# Patient Record
Sex: MALE | Race: WHITE | Employment: OTHER | URBAN - METROPOLITAN AREA
[De-identification: names, ages, dates, MRNs, and addresses within clinical notes are randomized per-mention and may not be internally consistent; named-entity substitution may affect disease eponyms.]

---

## 2021-08-15 ENCOUNTER — APPOINTMENT (OUTPATIENT)
Dept: CT IMAGING | Age: 71
DRG: 068 | End: 2021-08-15
Payer: MEDICARE

## 2021-08-15 ENCOUNTER — HOSPITAL ENCOUNTER (INPATIENT)
Age: 71
LOS: 3 days | Discharge: HOME | DRG: 068 | End: 2021-08-18
Attending: EMERGENCY MEDICINE | Admitting: INTERNAL MEDICINE
Payer: MEDICARE

## 2021-08-15 DIAGNOSIS — G47.30 SLEEP APNEA, UNSPECIFIED TYPE: ICD-10-CM

## 2021-08-15 DIAGNOSIS — I65.1 BASILAR ARTERY OCCLUSION: ICD-10-CM

## 2021-08-15 DIAGNOSIS — G45.0 VERTEBROBASILAR INSUFFICIENCY: ICD-10-CM

## 2021-08-15 DIAGNOSIS — R42 DIZZINESS: ICD-10-CM

## 2021-08-15 DIAGNOSIS — R42 VERTIGO: Primary | ICD-10-CM

## 2021-08-15 LAB
A/G RATIO: 1.2 (ref 1.1–2.2)
ALBUMIN SERPL-MCNC: 4.2 G/DL (ref 3.4–5)
ALP BLD-CCNC: 103 U/L (ref 40–129)
ALT SERPL-CCNC: 20 U/L (ref 10–40)
ANION GAP SERPL CALCULATED.3IONS-SCNC: 11 MMOL/L (ref 3–16)
APTT: 37.5 SEC (ref 26.2–38.6)
AST SERPL-CCNC: 18 U/L (ref 15–37)
BASOPHILS ABSOLUTE: 0 K/UL (ref 0–0.2)
BASOPHILS RELATIVE PERCENT: 0.5 %
BILIRUB SERPL-MCNC: <0.2 MG/DL (ref 0–1)
BILIRUBIN URINE: NEGATIVE
BLOOD, URINE: NEGATIVE
BUN BLDV-MCNC: 14 MG/DL (ref 7–20)
CALCIUM SERPL-MCNC: 9.4 MG/DL (ref 8.3–10.6)
CHLORIDE BLD-SCNC: 101 MMOL/L (ref 99–110)
CLARITY: CLEAR
CO2: 29 MMOL/L (ref 21–32)
COLOR: YELLOW
CREAT SERPL-MCNC: 0.9 MG/DL (ref 0.8–1.3)
EOSINOPHILS ABSOLUTE: 0.2 K/UL (ref 0–0.6)
EOSINOPHILS RELATIVE PERCENT: 2.3 %
GFR AFRICAN AMERICAN: >60
GFR NON-AFRICAN AMERICAN: >60
GLOBULIN: 3.5 G/DL
GLUCOSE BLD-MCNC: 94 MG/DL (ref 70–99)
GLUCOSE URINE: NEGATIVE MG/DL
HCT VFR BLD CALC: 43 % (ref 40.5–52.5)
HEMOGLOBIN: 15 G/DL (ref 13.5–17.5)
INR BLD: 0.98 (ref 0.88–1.12)
KETONES, URINE: NEGATIVE MG/DL
LEUKOCYTE ESTERASE, URINE: NEGATIVE
LYMPHOCYTES ABSOLUTE: 2.6 K/UL (ref 1–5.1)
LYMPHOCYTES RELATIVE PERCENT: 29.1 %
MCH RBC QN AUTO: 31.1 PG (ref 26–34)
MCHC RBC AUTO-ENTMCNC: 34.8 G/DL (ref 31–36)
MCV RBC AUTO: 89.4 FL (ref 80–100)
MICROSCOPIC EXAMINATION: NORMAL
MONOCYTES ABSOLUTE: 0.8 K/UL (ref 0–1.3)
MONOCYTES RELATIVE PERCENT: 8.8 %
NEUTROPHILS ABSOLUTE: 5.3 K/UL (ref 1.7–7.7)
NEUTROPHILS RELATIVE PERCENT: 59.3 %
NITRITE, URINE: NEGATIVE
PDW BLD-RTO: 13.6 % (ref 12.4–15.4)
PH UA: 7.5 (ref 5–8)
PLATELET # BLD: 221 K/UL (ref 135–450)
PMV BLD AUTO: 7.7 FL (ref 5–10.5)
POTASSIUM REFLEX MAGNESIUM: 4 MMOL/L (ref 3.5–5.1)
PRO-BNP: 92 PG/ML (ref 0–124)
PROTEIN UA: NEGATIVE MG/DL
PROTHROMBIN TIME: 11.1 SEC (ref 9.9–12.7)
RBC # BLD: 4.82 M/UL (ref 4.2–5.9)
SODIUM BLD-SCNC: 141 MMOL/L (ref 136–145)
SPECIFIC GRAVITY UA: 1.01 (ref 1–1.03)
TOTAL PROTEIN: 7.7 G/DL (ref 6.4–8.2)
TROPONIN: <0.01 NG/ML
URINE TYPE: NORMAL
UROBILINOGEN, URINE: 0.2 E.U./DL
WBC # BLD: 8.9 K/UL (ref 4–11)

## 2021-08-15 PROCEDURE — 99283 EMERGENCY DEPT VISIT LOW MDM: CPT

## 2021-08-15 PROCEDURE — 83880 ASSAY OF NATRIURETIC PEPTIDE: CPT

## 2021-08-15 PROCEDURE — 85730 THROMBOPLASTIN TIME PARTIAL: CPT

## 2021-08-15 PROCEDURE — 85025 COMPLETE CBC W/AUTO DIFF WBC: CPT

## 2021-08-15 PROCEDURE — 6370000000 HC RX 637 (ALT 250 FOR IP): Performed by: EMERGENCY MEDICINE

## 2021-08-15 PROCEDURE — 2060000000 HC ICU INTERMEDIATE R&B

## 2021-08-15 PROCEDURE — 93005 ELECTROCARDIOGRAM TRACING: CPT | Performed by: INTERNAL MEDICINE

## 2021-08-15 PROCEDURE — 2580000003 HC RX 258: Performed by: EMERGENCY MEDICINE

## 2021-08-15 PROCEDURE — 84484 ASSAY OF TROPONIN QUANT: CPT

## 2021-08-15 PROCEDURE — 81003 URINALYSIS AUTO W/O SCOPE: CPT

## 2021-08-15 PROCEDURE — 70450 CT HEAD/BRAIN W/O DYE: CPT

## 2021-08-15 PROCEDURE — 6360000004 HC RX CONTRAST MEDICATION: Performed by: EMERGENCY MEDICINE

## 2021-08-15 PROCEDURE — 85610 PROTHROMBIN TIME: CPT

## 2021-08-15 PROCEDURE — 70496 CT ANGIOGRAPHY HEAD: CPT

## 2021-08-15 PROCEDURE — 80053 COMPREHEN METABOLIC PANEL: CPT

## 2021-08-15 RX ORDER — ONDANSETRON 4 MG/1
4 TABLET, ORALLY DISINTEGRATING ORAL EVERY 8 HOURS PRN
Status: DISCONTINUED | OUTPATIENT
Start: 2021-08-15 | End: 2021-08-18 | Stop reason: HOSPADM

## 2021-08-15 RX ORDER — POLYETHYLENE GLYCOL 3350 17 G/17G
17 POWDER, FOR SOLUTION ORAL DAILY PRN
Status: DISCONTINUED | OUTPATIENT
Start: 2021-08-15 | End: 2021-08-18 | Stop reason: HOSPADM

## 2021-08-15 RX ORDER — ASPIRIN 81 MG/1
81 TABLET ORAL DAILY
Status: DISCONTINUED | OUTPATIENT
Start: 2021-08-16 | End: 2021-08-18 | Stop reason: HOSPADM

## 2021-08-15 RX ORDER — SODIUM CHLORIDE, SODIUM LACTATE, POTASSIUM CHLORIDE, CALCIUM CHLORIDE 600; 310; 30; 20 MG/100ML; MG/100ML; MG/100ML; MG/100ML
1000 INJECTION, SOLUTION INTRAVENOUS ONCE
Status: COMPLETED | OUTPATIENT
Start: 2021-08-15 | End: 2021-08-15

## 2021-08-15 RX ORDER — ACETAMINOPHEN 325 MG/1
650 TABLET ORAL EVERY 6 HOURS PRN
Status: DISCONTINUED | OUTPATIENT
Start: 2021-08-15 | End: 2021-08-18 | Stop reason: HOSPADM

## 2021-08-15 RX ORDER — HYDROCHLOROTHIAZIDE 25 MG/1
12.5 TABLET ORAL DAILY
Status: CANCELLED | OUTPATIENT
Start: 2021-08-16

## 2021-08-15 RX ORDER — HYDROCHLOROTHIAZIDE 25 MG/1
25 TABLET ORAL DAILY
Status: ON HOLD | COMMUNITY
End: 2021-08-18 | Stop reason: HOSPADM

## 2021-08-15 RX ORDER — ONDANSETRON 2 MG/ML
4 INJECTION INTRAMUSCULAR; INTRAVENOUS EVERY 6 HOURS PRN
Status: DISCONTINUED | OUTPATIENT
Start: 2021-08-15 | End: 2021-08-18 | Stop reason: HOSPADM

## 2021-08-15 RX ORDER — CLOPIDOGREL 300 MG/1
600 TABLET, FILM COATED ORAL ONCE
Status: COMPLETED | OUTPATIENT
Start: 2021-08-15 | End: 2021-08-15

## 2021-08-15 RX ORDER — ACETAMINOPHEN 650 MG/1
650 SUPPOSITORY RECTAL EVERY 6 HOURS PRN
Status: DISCONTINUED | OUTPATIENT
Start: 2021-08-15 | End: 2021-08-18 | Stop reason: HOSPADM

## 2021-08-15 RX ORDER — AMLODIPINE BESYLATE 5 MG/1
5 TABLET ORAL DAILY
Status: CANCELLED | OUTPATIENT
Start: 2021-08-16

## 2021-08-15 RX ORDER — AMLODIPINE BESYLATE 5 MG/1
5 TABLET ORAL DAILY
Status: ON HOLD | COMMUNITY
End: 2021-08-18 | Stop reason: HOSPADM

## 2021-08-15 RX ORDER — SODIUM CHLORIDE 0.9 % (FLUSH) 0.9 %
5-40 SYRINGE (ML) INJECTION PRN
Status: DISCONTINUED | OUTPATIENT
Start: 2021-08-15 | End: 2021-08-18 | Stop reason: HOSPADM

## 2021-08-15 RX ORDER — SODIUM CHLORIDE 9 MG/ML
25 INJECTION, SOLUTION INTRAVENOUS PRN
Status: DISCONTINUED | OUTPATIENT
Start: 2021-08-15 | End: 2021-08-18 | Stop reason: HOSPADM

## 2021-08-15 RX ORDER — SODIUM CHLORIDE 0.9 % (FLUSH) 0.9 %
5-40 SYRINGE (ML) INJECTION EVERY 12 HOURS SCHEDULED
Status: DISCONTINUED | OUTPATIENT
Start: 2021-08-15 | End: 2021-08-18 | Stop reason: HOSPADM

## 2021-08-15 RX ORDER — CLOPIDOGREL BISULFATE 75 MG/1
75 TABLET ORAL DAILY
Status: DISCONTINUED | OUTPATIENT
Start: 2021-08-16 | End: 2021-08-18 | Stop reason: HOSPADM

## 2021-08-15 RX ADMIN — CLOPIDOGREL BISULFATE 600 MG: 300 TABLET, FILM COATED ORAL at 21:16

## 2021-08-15 RX ADMIN — SODIUM CHLORIDE, SODIUM LACTATE, POTASSIUM CHLORIDE, AND CALCIUM CHLORIDE 1000 ML: .6; .31; .03; .02 INJECTION, SOLUTION INTRAVENOUS at 17:32

## 2021-08-15 RX ADMIN — IOPAMIDOL 80 ML: 755 INJECTION, SOLUTION INTRAVENOUS at 18:31

## 2021-08-15 ASSESSMENT — ENCOUNTER SYMPTOMS
SHORTNESS OF BREATH: 0
COUGH: 0
BACK PAIN: 0
ABDOMINAL PAIN: 0

## 2021-08-15 NOTE — ED PROVIDER NOTES
[address] [facility]          ATTENDING PHYSICIAN NOTE       Date of evaluation: 8/15/2021    Chief Complaint     Swelling (SWELLING BILAT HANDS) and Dizziness (X FEW WEEKS; DAUGHTER IS WORRIED ABOUT TIA)      History of Present Illness     Ainsley Go is a 70 y.o. male who presents With a complaint of bilateral hand swelling while walking, and episodic dizziness. The patient states that he has had multiple episodes usually occur around mealtime when he is seated where he has dizziness, feels like he will fall out, and then walks away from the table. He does take 2 antihypertensives but does not check his blood pressure at home. His daughter was worried that these could represent a \"blood flow problem to his brain \"and so sent him here for evaluation. He states that he has a little bit of dizziness right now but is not particularly bothersome. He denies any worsening of the dizziness or vertigo with rotation of his head, and denies any recent fevers or chills. He has no history of stroke, does take an aspirin daily but no anticoagulant. Review of Systems     Review of Systems   Constitutional: Negative for chills. Respiratory: Negative for cough and shortness of breath. Cardiovascular: Negative for chest pain. Gastrointestinal: Negative for abdominal pain. Musculoskeletal: Negative for back pain, neck pain and neck stiffness. Skin: Negative for pallor and wound. Neurological: Positive for dizziness. Negative for tremors, weakness, light-headedness and numbness. All other systems reviewed and are negative. Past Medical, Surgical, Family, and Social History     He has a past medical history of Hypertension. He has a past surgical history that includes Cholecystectomy. His family history is not on file. He reports that he has never smoked. He has never used smokeless tobacco. He reports current alcohol use.     Medications     Previous Medications    No medications on file       Allergies     He has No Known Allergies. Physical Exam     INITIAL VITALS: BP: (!) 170/81, Temp: 98.4 °F (36.9 °C), Pulse: 68, Resp: 16, SpO2: 99 %   Physical Exam  Constitutional: Well nourished pleasant elderly  male who appears in no acute distress. HEENT: NC/AT. PERRL 4-2 bilaterally. EOMI. he has bilateral nystagmus with full excursion. No reproduction of vertigo with eye excursion. CV:Heart is regular rate and rhythm without murmurs, rubs or gallops. Resp: Respirations unlabored. Lungs clear to auscultation w/o wheezing. Abd: Soft, nontender, nondistended. MSK: Full ROM, no edema or tenderness to palpation. Skin: Extremities are warm and well perfused. 2+ radial pulses . Cap Refill <3 seconds. Neuro: A&Ox3. Cranial nerves grossly intact   Strength and sensation intact x4 extremities. No incoordination on finger-nose-finger or heel-knee-shin maneuvers. No extremity drift. No facial asymmetry, or aphasia. Psych: Thought content, behavior & judgement normal.    Diagnostic Results     EKG   EKG shows sinus rhythm with a rate of 67, , QRS of 108 and QTc 454. No acute ST segment elevations or depressions are noted. T wave Inversions in aVR and V1 are normal variant. RADIOLOGY:  CT Head WO Contrast   Final Result   1. No acute intracranial abnormality. CTA HEAD NECK W CONTRAST   Final Result      Neck      1. No significant vascular abnormality in the neck. Head      1. 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 The inferior basilar artery is supplied by a small right vertebral artery. The left vertebral artery ends in the anterior inferior cerebellar artery. Fetal origin of the posterior cerebral    arteries with reconstitution of the distal basilar artery.       Critical results reported to Dr. Yanci Felder at [facility] PM.          LABS:   Results for orders placed or performed during the hospital encounter of 08/15/21 CBC Auto Differential   Result Value Ref Range    WBC 8.9 4.0 - 11.0 K/uL    RBC 4.82 4.20 - 5.90 M/uL    Hemoglobin 15.0 13.5 - 17.5 g/dL    Hematocrit 43.0 40.5 - 52.5 %    MCV 89.4 80.0 - 100.0 fL    MCH 31.1 26.0 - 34.0 pg    MCHC 34.8 31.0 - 36.0 g/dL    RDW 13.6 12.4 - 15.4 %    Platelets 158 400 - 109 K/uL    MPV 7.7 5.0 - 10.5 fL    Neutrophils % 59.3 %    Lymphocytes % 29.1 %    Monocytes % 8.8 %    Eosinophils % 2.3 %    Basophils % 0.5 %    Neutrophils Absolute 5.3 1.7 - 7.7 K/uL    Lymphocytes Absolute 2.6 1.0 - 5.1 K/uL    Monocytes Absolute 0.8 0.0 - 1.3 K/uL    Eosinophils Absolute 0.2 0.0 - 0.6 K/uL    Basophils Absolute 0.0 0.0 - 0.2 K/uL   Comprehensive Metabolic Panel w/ Reflex to MG   Result Value Ref Range    Sodium 141 136 - 145 mmol/L    Potassium reflex Magnesium 4.0 3.5 - 5.1 mmol/L    Chloride 101 99 - 110 mmol/L    CO2 29 21 - 32 mmol/L    Anion Gap 11 3 - 16    Glucose 94 70 - 99 mg/dL    BUN 14 7 - 20 mg/dL    CREATININE 0.9 0.8 - 1.3 mg/dL    GFR Non-African American >60 >60    GFR African American >60 >60    Calcium 9.4 8.3 - 10.6 mg/dL    Total Protein 7.7 6.4 - 8.2 g/dL    Albumin 4.2 3.4 - 5.0 g/dL    Albumin/Globulin Ratio 1.2 1.1 - 2.2    Total Bilirubin <0.2 0.0 - 1.0 mg/dL    Alkaline Phosphatase 103 40 - 129 U/L    ALT 20 10 - 40 U/L    AST 18 15 - 37 U/L    Globulin 3.5 g/dL   Troponin   Result Value Ref Range    Troponin <0.01 <0.01 ng/mL   Brain Natriuretic Peptide   Result Value Ref Range    Pro-BNP 92 0 - 124 pg/mL   Protime-INR   Result Value Ref Range    Protime 11.1 9.9 - 12.7 sec    INR 0.98 0.88 - 1.12   APTT   Result Value Ref Range    aPTT 37.5 26.2 - 38.6 sec   Urinalysis, reflex to microscopic   Result Value Ref Range    Color, UA Yellow Straw/Yellow    Clarity, UA Clear Clear    Glucose, Ur Negative Negative mg/dL    Bilirubin Urine Negative Negative    Ketones, Urine Negative Negative mg/dL    Specific Gravity, UA 1.015 1.005 - 1.030    Blood, Urine Negative Negative    pH, UA 7.5 5.0 - 8.0    Protein, UA Negative Negative mg/dL    Urobilinogen, Urine 0.2 <2.0 E.U./dL    Nitrite, Urine Negative Negative    Leukocyte Esterase, Urine Negative Negative    Microscopic Examination Not Indicated     Urine Type Other            RECENT VITALS:  BP: (!) 162/70,Temp: 98.4 °F (36.9 °C), Pulse: 66, Resp: 16, SpO2: 99 %     Procedures         ED Course     Nursing Notes, Past Medical Hx, Past Surgical Hx, Social Hx,Allergies, and Family Hx were reviewed. patient was given the following medications:  Orders Placed This Encounter   Medications    lactated ringers infusion 1,000 mL    iopamidol (ISOVUE-370) 76 % injection 80 mL    clopidogrel (PLAVIX) tablet 600 mg       CONSULTS:  IP CONSULT TO NEUROSURGERY  IP CONSULT TO HOSPITALIST    MEDICAL DECISIONMAKING / ASSESSMENT / Inetta Floor is a 70 y.o. male presenting with a complaint of intermittent vertigo and dizziness. On examinations  stroke scale is 0. Given his age and atherosclerotic risk factors, CT and CTA were performed. CT showed no acute hemorrhage or subacute infarct. CTA demonstrated a short distal basilar occlusion, with bilateral fetal PCAs. Given his NIH stroke scale of 0, TPA is not indicated. I discussed the case with Dr. Conchis Harvey, on-call for [place] neuro intervention who agreed that intervention was not dictated at this time. We agreed to load Plavix and continue his aspirin. As there is no subocclusive thrombus, I do not believe that heparin would be of utility at this point. Patient's wife and daughter were informed of these findings and treatment plan as well. Will admit to PCU for acute 2-hour neuro checks. Discussed with Dr. Pooja Pickett admitting hospitalist and the AOD. Laboratory work was unremarkable with no sign of coagulopathy, normal troponin and BNP, and unremarkable electrolyte panel and CBC.     Critical Care:  Due to the immediate potential for life-threatening deterioration due to basilar occlusion, I spent 45 minutes providing critical care. This time excludes time spent performing procedures but includes time spent on direct patient care, history retrieval, review of the chart, and discussions with patient, family, and consultant(s). Clinical Impression     1. Vertigo    2. Vertebrobasilar insufficiency        Disposition     PATIENT REFERRED TO:  No follow-up provider specified.     DISCHARGE MEDICATIONS:  New Prescriptions    No medications on file       DISPOSITION Admitted 08/15/2021 08:01:30 PM          Chuyita Graf MD  08/15/21 2049

## 2021-08-16 ENCOUNTER — APPOINTMENT (OUTPATIENT)
Dept: MRI IMAGING | Age: 71
DRG: 068 | End: 2021-08-16
Payer: MEDICARE

## 2021-08-16 LAB
ANION GAP SERPL CALCULATED.3IONS-SCNC: 10 MMOL/L (ref 3–16)
BUN BLDV-MCNC: 11 MG/DL (ref 7–20)
CALCIUM SERPL-MCNC: 9.2 MG/DL (ref 8.3–10.6)
CHLORIDE BLD-SCNC: 104 MMOL/L (ref 99–110)
CHOLESTEROL, TOTAL: 158 MG/DL (ref 0–199)
CO2: 25 MMOL/L (ref 21–32)
CREAT SERPL-MCNC: 0.8 MG/DL (ref 0.8–1.3)
EKG ATRIAL RATE: 67 BPM
EKG DIAGNOSIS: NORMAL
EKG P AXIS: 8 DEGREES
EKG P-R INTERVAL: 158 MS
EKG Q-T INTERVAL: 430 MS
EKG QRS DURATION: 108 MS
EKG QTC CALCULATION (BAZETT): 454 MS
EKG R AXIS: 60 DEGREES
EKG T AXIS: 66 DEGREES
EKG VENTRICULAR RATE: 67 BPM
GFR AFRICAN AMERICAN: >60
GFR NON-AFRICAN AMERICAN: >60
GLUCOSE BLD-MCNC: 110 MG/DL (ref 70–99)
HCT VFR BLD CALC: 41.8 % (ref 40.5–52.5)
HDLC SERPL-MCNC: 29 MG/DL (ref 40–60)
HEMOGLOBIN: 14.3 G/DL (ref 13.5–17.5)
LDL CHOLESTEROL CALCULATED: 107 MG/DL
MCH RBC QN AUTO: 30.2 PG (ref 26–34)
MCHC RBC AUTO-ENTMCNC: 34.2 G/DL (ref 31–36)
MCV RBC AUTO: 88.2 FL (ref 80–100)
PDW BLD-RTO: 13.3 % (ref 12.4–15.4)
PLATELET # BLD: 203 K/UL (ref 135–450)
PMV BLD AUTO: 7.4 FL (ref 5–10.5)
POTASSIUM REFLEX MAGNESIUM: 3.9 MMOL/L (ref 3.5–5.1)
RBC # BLD: 4.73 M/UL (ref 4.2–5.9)
SODIUM BLD-SCNC: 139 MMOL/L (ref 136–145)
TRIGL SERPL-MCNC: 110 MG/DL (ref 0–150)
TSH REFLEX: 3.81 UIU/ML (ref 0.27–4.2)
VLDLC SERPL CALC-MCNC: 22 MG/DL
WBC # BLD: 9.7 K/UL (ref 4–11)

## 2021-08-16 PROCEDURE — 36415 COLL VENOUS BLD VENIPUNCTURE: CPT

## 2021-08-16 PROCEDURE — 2580000003 HC RX 258: Performed by: STUDENT IN AN ORGANIZED HEALTH CARE EDUCATION/TRAINING PROGRAM

## 2021-08-16 PROCEDURE — 6370000000 HC RX 637 (ALT 250 FOR IP): Performed by: STUDENT IN AN ORGANIZED HEALTH CARE EDUCATION/TRAINING PROGRAM

## 2021-08-16 PROCEDURE — 70551 MRI BRAIN STEM W/O DYE: CPT

## 2021-08-16 PROCEDURE — 83036 HEMOGLOBIN GLYCOSYLATED A1C: CPT

## 2021-08-16 PROCEDURE — 80048 BASIC METABOLIC PNL TOTAL CA: CPT

## 2021-08-16 PROCEDURE — 80061 LIPID PANEL: CPT

## 2021-08-16 PROCEDURE — 2060000000 HC ICU INTERMEDIATE R&B

## 2021-08-16 PROCEDURE — 84443 ASSAY THYROID STIM HORMONE: CPT

## 2021-08-16 PROCEDURE — 6360000002 HC RX W HCPCS: Performed by: INTERNAL MEDICINE

## 2021-08-16 PROCEDURE — 85027 COMPLETE CBC AUTOMATED: CPT

## 2021-08-16 RX ORDER — ATORVASTATIN CALCIUM 40 MG/1
40 TABLET, FILM COATED ORAL NIGHTLY
Status: DISCONTINUED | OUTPATIENT
Start: 2021-08-16 | End: 2021-08-18 | Stop reason: HOSPADM

## 2021-08-16 RX ORDER — LORAZEPAM 2 MG/ML
1 INJECTION INTRAMUSCULAR ONCE
Status: COMPLETED | OUTPATIENT
Start: 2021-08-16 | End: 2021-08-17

## 2021-08-16 RX ADMIN — ACETAMINOPHEN 650 MG: 325 TABLET ORAL at 01:56

## 2021-08-16 RX ADMIN — CLOPIDOGREL BISULFATE 75 MG: 75 TABLET ORAL at 08:50

## 2021-08-16 RX ADMIN — LORAZEPAM 1 MG: 2 INJECTION INTRAMUSCULAR; INTRAVENOUS at 20:35

## 2021-08-16 RX ADMIN — Medication 10 ML: at 08:50

## 2021-08-16 RX ADMIN — ATORVASTATIN CALCIUM 40 MG: 40 TABLET, FILM COATED ORAL at 20:35

## 2021-08-16 RX ADMIN — ASPIRIN 81 MG: 81 TABLET, COATED ORAL at 08:50

## 2021-08-16 ASSESSMENT — PAIN SCALES - GENERAL
PAINLEVEL_OUTOF10: 0
PAINLEVEL_OUTOF10: 0
PAINLEVEL_OUTOF10: 3

## 2021-08-16 NOTE — FLOWSHEET NOTE
08/16/21 1221   Encounter Summary   Services provided to: Patient and family together   Referral/Consult From: Patient; Family   Continue Visiting   (es 8/16)   Complexity of Encounter High   Length of Encounter 1 hour   Advance Care Planning Yes   Spiritual/Sikhism   Type Spiritual support   Assessment Approachable   Intervention Active listening;Explored feelings, thoughts, concerns;Explored coping resources;Nurtured hope;Prayer;Discussed belief system/Yazdanism practices/eliezer;Discussed illness/injury and it's impact   Outcome Engaged in conversation;Receptive   Primary Decision Maker (Healthcare Proxy)   Patient's Healthcare Decision Maker is: Named in [address] [place] [address]   conversation and prayer. Placed HCPOA and Living Will paperwork in the chart and faxed to Health Information Management team.  Documents were provided by patient and family.

## 2021-08-16 NOTE — CONSULTS
NEUROSURGERY CONSULT NOTE    Oumar Actsanjuana  [id number]   1950 8/16/2021    Requesting physician: Evelina Haji DO    Reason for consultation: Basilar artery occlusion vs critical stenosis    History of present illness: Patient is a 70 y.o. male that  has a past medical history of Hypertension. Patient presented on 8/15/2021 to [facility] ED c/o episodic dizziness. The patient states he had multiple episodes of dizziness. He does take 2 antihypertensives but does not check his blood pressure at home. He denies any worsening of the dizziness or vertigo with rotation of his head, and denies any recent fevers or chills. He has no history of stroke, does take an aspirin daily but no anticoagulant. CT Head negative for any abnormality, but CTA Head/Neck shows mid/distal basilar artery occlusion versus critical stenosis. ROS:   GENERAL:  Denies fever or recent illness. Denies weight changes   EYES:  Denies vision change or diplopia  EARS:  Denies hearing loss  CARDIAC:  Denies chest pain  RESPIRATORY:  Denies shortness of breath  SKIN:  Denies rash or lesions   HEM:  Denies excessive bruising  PSYCH:  Denies anxiety or depression  NEURO: Endorses dizziness; Denies headache, numbness or tingling or lateralizing weakness   :  Denies urinary difficulty  GI: Denies nausea, vomiting, diarrhea or constipation  MUSCULOSKELETAL:  No arthralgias    Allergies   Allergen Reactions    Lisinopril      Other reaction(s): Drowsy    Meperidine Rash       Past Medical History:   Diagnosis Date    Hypertension         Past Surgical History:   Procedure Laterality Date    CHOLECYSTECTOMY         Social History     Occupational History    Not on file   Tobacco Use    Smoking status: Never Smoker    Smokeless tobacco: Never Used   Substance and Sexual Activity    Alcohol use: Yes     Comment: X 2 TIMES A WEEK    Drug use: Not on file    Sexual activity: Not on file        History reviewed. No pertinent family history. Outpatient Medications Marked as Taking for the 8/15/21 encounter [facility] Encounter)   Medication Sig Dispense Refill    amLODIPine (NORVASC) 5 MG tablet Take 5 mg by mouth daily      hydroCHLOROthiazide (HYDRODIURIL) 25 MG tablet Take 25 mg by mouth daily          Current Facility-Administered Medications   Medication Dose Route Frequency Provider Last Rate Last Admin    atorvastatin (LIPITOR) tablet 40 mg  40 mg Oral Nightly Kathy Ahn MD        LORazepam (ATIVAN) injection 1 mg  1 mg Intravenous Once Margot Ramachandran MD        sodium chloride flush 0.9 % injection 5-40 mL  5-40 mL Intravenous 2 times per day May MD Marisa   10 mL at 08/16/21 0850    sodium chloride flush 0.9 % injection 5-40 mL  5-40 mL Intravenous PRN May MD Marisa        0.9 % sodium chloride infusion  25 mL Intravenous PRN May MD Marisa        ondansetron (ZOFRAN-ODT) disintegrating tablet 4 mg  4 mg Oral Q8H PRN May MD Marisa        Or    ondansetron [facility] injection 4 mg  4 mg Intravenous Q6H PRN May MD Marisa        polyethylene glycol [facility]) packet 17 g  17 g Oral Daily PRN May MD Marisa        acetaminophen (TYLENOL) tablet 650 mg  650 mg Oral Q6H PRN May MD Marisa   650 mg at 08/16/21 0156    Or    acetaminophen (TYLENOL) suppository 650 mg  650 mg Rectal Q6H PRN May MD Marisa        clopidogrel (PLAVIX) tablet 75 mg  75 mg Oral Daily May MD Marisa   75 mg at 08/16/21 0850    aspirin EC tablet 81 mg  81 mg Oral Daily May MD Marisa   81 mg at 08/16/21 0850        Objective:  BP (!) 157/89   Pulse 74   Temp 98 °F (36.7 °C) (Oral)   Resp 16   SpO2 96%     Physical Exam:   Patient seen and examined  GCS:  4 - Opens eyes on own  5 - Alert and oriented  6 - Follows simple motor commands  General: Well developed. Alert and cooperative in no acute distress.      HENT: atraumatic, neck supple  Eyes: Optic discs: Not tested  Pulmonary: unlabored respiratory effort  Cardiovascular:  Warm well perfused. No peripheral edema  Gastrointestinal: abdomen soft, NT, ND    Neurological:  Mental Status: Awake, alert, oriented x 4, speech clear and appropriate  Attention: Intact  Language: No aphasia or dysarthria noted  Sensation: Intact to all extremities to light touch  Coordination: Intact    Cranial Nerves:  II: Visual acuity not tested, denies new visual changes / diplopia  III, IV, VI: PERRL, 3 mm bilaterally, EOMI, no nystagmus noted  V: Facial sensation intact bilaterally to touch  VII: Face symmetric  VIII: Hearing intact bilaterally to spoken voice  IX: Palate movement equal bilaterally  XI: Shoulder shrug equal bilaterally  XII: Tongue midline    Musculoskeletal:   Gait: Not tested   Assist devices: None   Tone: Normal  Motor strength:    Right  Left    Right  Left    Deltoid  5 5  Hip Flex  5 5   Biceps  5 5  Knee Extensors  5 5   Triceps  5 5  Knee Flexors  5 5   Wrist Ext  5 5  Ankle Dorsiflex. 5 5   Wrist Flex  5 5  Ankle Plantarflex. 5 5   Handgrip  5 5  Ext Stevie Longus  5 5   Thumb Ext  5 5         Radiological Findings:  CT Head WO Contrast  Result Date: 8/15/2021  No acute intracranial abnormality. CTA HEAD NECK W CONTRAST  Result Date: 8/15/2021  Head-  1. 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 The inferior basilar artery is supplied by a small right vertebral artery. Fetal origin of the posterior cerebral arteries with reconstitution of the distal basilar artery. 2. The left vertebral artery ends in the anterior inferior cerebellar artery. Neck-  No significant vascular abnormality in the neck.        Labs:  Recent Labs     08/16/21  0547   WBC 9.7   HGB 14.3   HCT 41.8          Recent Labs     08/16/21  0547      K 3.9      CO2 25   BUN 11   CREATININE 0.8   GLUCOSE 110*   CALCIUM 9.2       Recent Labs     08/15/21  1727   PROTIME 11.1   INR 0.98   APTT 37.5       Patient Active Problem List    Diagnosis Date Noted    Basilar artery occlusion 08/15/2021       Assessment:  Patient is a 70 y.o. male w/ dizziness. CTA shows mid/distal basilar artery occlusion versus critical stenosis. Plan:  1. No emergent neurosurgical intervention indicated  2. Neurologic exams frequency: Q2H  3. For change in exam MUST contact neurosurgery team along with critical care or primary team  4. Basilar artery occlusion vs critical stenosis:  - MRI Brain to evaluate for infarcts and more recs after MRI reviewed  - DAPT  5. PT/OT consulted, appreciate recs  6. Advance diet / activity per primary team  7. Thank you for consult. Will follow inpatient. Please call with any questions or decline in neurological status    DISPO: Remain inpatient from neurosurgery standpoint. Dispo timing to be determined by primary team once patient is medically stable for discharge. Patient was discussed with Dr. Taurus Nichols who agrees with above assessment and plan.      Electronically signed by: ROBERTA Handley CNP, APRN-CNP, 8/16/2021 11:44 AM  [phone]

## 2021-08-16 NOTE — ACP (ADVANCE CARE PLANNING)
Advance Care Planning     Advance Care Planning Inpatient Note  [facility] Department    Today's Date: 8/16/2021  Unit: [facility] 5T ORTHO/NEURO    Received request from patient and family. Upon review of chart and communication with care team, patient's decision making abilities are not in question. Patient and Child/Childrenwas/were present in the room during visit. Goals of ACP Conversation:  Discuss advance care planning documents    Health Care Decision Makers:    Spouse, Claudette Sutherland [phone]  Click here to complete [employer] including selection of the Healthcare Decision Maker Relationship (ie \"Primary\")     Today we:  Verified documents provided by patient and scanned them into the chart. Advance Care Planning Documents (Patient Wishes):  Healthcare Power of /Advance Directive Appointment of [address]  Living Will/Advance Directive     Assessment:  N/A     Interventions:  Reviewed ACP document(s) on record for proper execution and need for updating. Outcomes/Plan:  Existing advance directive reviewed with patient; no changes to patient's previously recorded wishes. Copy of advance directive given to staff to scan into medical record.   Teach Back Method used to verify the patient's and/or Healthcare Decision Maker's understanding of key information in the advance directive documents    Electronically signed by Shaunna Parra [employer] on 8/16/2021 at 12:25 PM

## 2021-08-16 NOTE — CARE COORDINATION
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 Current PCP: Eleni Bell MD  Clinic Patient: No    Chart Reviewed: Yes  Patient/ Family Interviewed: Yes    Initial assessment completed at bedside with: Patient, daughter     Hospitalization in the last 30 days: No    Emergency Contacts:  Extended Emergency Contact Information  Primary Emergency Contact: Erick Núñezcourtney [address] Phone: [phone]  Relation: Spouse    Advance Directives:   Code Status: Full [address]: Yes, see above     Financial:  Payor: Brenda Rae / Plan: Nora Ruanoyecleopatra Woodson / Product Type: *No Product type* /     Pre-cert required for SNF: Yes    Pharmacy:  No Pharmacies Listed    Potential assistance Purchasing Medications:    Does Patient want to participate in local refill/ meds to beds program?:      Meds To Beds General Rules:  1. Can ONLY be done Monday- Friday between 8:30am-5pm  2. Prescription(s) must be in pharmacy by 3pm to be filled same day  3. Copy of patient's insurance/ prescription drug card and patient face sheet must be sent along with the prescription(s)  4. Cost of Rx cannot be added to hospital bill. If financial assistance is needed, please contact unit  or ;  or  CANNOT provide pharmacy voucher for patients co-pays  5.  Patients can then  the prescription on their way out of the hospital at discharge, or pharmacy can deliver to the bedside if staff is available. (payment due at time of pick-up or delivery - cash, check, or card accepted)     Able to afford home medications/ co-pay costs: Yes    ADLS:  Support Systems:      PT AM-PAC:   /24  OT AM-PAC:   /24    Housing:  Home Environment: Home independent at baseline   Steps: yes     Plans to RETURN to current housing: Yes  Barrier(s) to RETURNING to current housing: pending work up and clearance     Home Care Information:  Currently ACTIVE with [address]: No  Home Care Agency: Not Applicable    Currently ACTIVE with [place] [facility]: No    Durable Medical Equipment:  DME Provider: N/A   Equipment: cane      DISCHARGE PLAN:  Disposition: Home w/Home Health vs. Outpatient therapy pending needs and ability to transport self. Transportation PLAN for discharge: family     Factors facilitating achievement of predicted outcomes: Family support, Cooperative and Pleasant    Barriers to discharge: None     Additional Case Management Notes:   SW met with patient and daughter at bedside. Patient is from home where he is independent at baseline. Patient getting work up today. Patient plans to return home at discharge. Therapy on hold pending MRI results. Patient and daughter open to working further with SW pending recommendations from medical team.     [address] will continue to follow. 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  Not Indicated    Care Transition patient: No    MYNOR Baker  [facility]   Case Management Department  Ph: [phone]

## 2021-08-16 NOTE — PROGRESS NOTES
Patient is alert and oriented. VSS at this time. Denies pain and nausea. Patient resting in bed. Fall risk protocol in place. Call light within reach.

## 2021-08-16 NOTE — PROGRESS NOTES
RN screened patients swallowing by administering the [facility] Protocol. The patient consumed 3 ounces of water by cup in sequential swallows without signs/symptoms of aspiration.

## 2021-08-16 NOTE — H&P
Internal Medicine  PGY 1  History & Physical      CC Dizziness    History Obtained From:  patient    HISTORY OF PRESENT ILLNESS:  Mr. Nasra León is a 71 yo male with PMH of HTN and Adjustment disorder with depressed mood who presents to the hospital today with intermitent dizziness. He says the dizziness started one month ago and is episodic in nature. It seems to happen when he eats food. It comes on suddenly and he gets lightheaded and tunnel visioned as if to pass out. He has not ever passed out though. He also gets generalized weakness during these episodes. Lying down makes the dizziness better and there are no aggravating symptoms. In ED labs unremarkable and patient HDS. 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 The inferior basilar artery is supplied by a small R vertebral artery. The L vertebral artery ends in the anterior inferior cerebellar artery. Fetal origin of the posterior cerebral arteries with reconstitution of the distal basilar artery. Past Medical History:        Diagnosis Date    Hypertension    ·     Past Surgical History:        Procedure Laterality Date    CHOLECYSTECTOMY     ·     Medications Priorto Admission:    · Not in a hospital admission. Allergies:  Patient has no known allergies. Social History:   · TOBACCO:   reports that he has never smoked. He has never used smokeless tobacco.  · ETOH: Denies   · DRUGS : None  · Patient currently lives with family: wife, daughter and grandchild   ·   Family History:   · History reviewed. No pertinent family history. ROS: A 10 point review of systems was conducted, significant findings as noted in HPI.     Physical Exam  Vitals:    08/15/21 1800   BP: (!) 162/70   Pulse: 66   Resp: 16   Temp:    SpO2: 99%     Constitutional: well developed male in NAD   Head: NCAT, no droop  Eyes: PERRLA, EOMI  Neck: supple, no LAD, no thyromegaly  Cardiac: RRR, normal S1/S2, no m/r/g  Pulm: CTAB  Abdo: No tenderness to palpation, no masses or organomegaly   Skin: no rash, no ecchymosis  Ext: No edema, periph pusles 2+  Neuro: CN II-XII intact, no focal neurological deficit, no motor weakness, no change in sensation, no facial droop, no visual field deficits, NIHSS 0       DATA:  Labs:  CBC:   Recent Labs     08/15/21  1727   WBC 8.9   HGB 15.0   HCT 43.0          BMP:   Recent Labs     08/15/21  1727      K 4.0      CO2 29   BUN 14   CREATININE 0.9   GLUCOSE 94     LFT's:   Recent Labs     08/15/21  1727   AST 18   ALT 20   BILITOT <0.2   ALKPHOS 103     Troponin:   Recent Labs     08/15/21  1727   TROPONINI <0.01     BNP:No results for input(s): BNP in the last 72 hours. ABGs: No results for input(s): PHART, ANQ7MFP, PO2ART in the last 72 hours. INR:   Recent Labs     08/15/21  1727   INR 0.98       U/A:  Recent Labs     08/15/21  1727   COLORU Yellow   PHUR 7.5   CLARITYU Clear   SPECGRAV 1.015   LEUKOCYTESUR Negative   UROBILINOGEN 0.2   BILIRUBINUR Negative   BLOODU Negative   GLUCOSEU Negative       CT Head WO Contrast   Final Result   1. No acute intracranial abnormality. CTA HEAD NECK W CONTRAST   Final Result      Neck      1. No significant vascular abnormality in the neck. Head      1. 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 The inferior basilar artery is supplied by a small right vertebral artery. The left vertebral artery ends in the anterior inferior cerebellar artery. Fetal origin of the posterior cerebral    arteries with reconstitution of the distal basilar artery. Critical results reported to Dr. Sofya Costello at 7:23 PM.              ASSESSMENT AND PLAN:  Mr. Mariangel Farrar is a 69 yo male with PMH of HTN and Adjustment disorder with depressed mood who presents to the hospital today with intermitent vertigo. 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  The inferior basilar artery is supplied by a small R vertebral artery. The L vertebral artery ends in the anterior inferior cerebellar artery. Fetal origin of the posterior cerebral arteries with reconstitution of the distal basilar artery. CTH was normal. Given the patient's symptoms of intermittent vertigo and imaging findings the patient likely has basilar insufficiency    Vertebrobasilar insufficiency  NIHSS zero. No need for acute intervention at this time. Will start DAPT and observe. - Neurosurgery consulted: recommend DAPT (ASA and Plavix) and MRI in the morning   - Q2H neurochecks     HTN  Will hold off on restarting home meds to allow for permissive HTN. - hold amlodipine 5 mg daily   - hold HCTZ 12.5 mg daily       Adjustment disorder with depressed mood   Patient reports difficult home situation. [facility] and [facility] 10 years ago. Had bad reaction to it and does not want any antidepressive medications. Denies SI/HI. Negative SIGECAPS. Will discuss with attending physician Dr. Arlet Marley Status:Full code  FEN: Regular diet   PPX: ICDs  DISPO: Tamia Partida MD PGY-1  8/15/2021,  8:06 PM    I discussed the care with the resident. I personally reviewed the HPI, PH, FH, SH, ROS and medications. I repeated pertinent portions of the examination and reviewed the relevant imaging and laboratory data. I agree with the findings, assessment and plan as documented.   Plan as above

## 2021-08-16 NOTE — PROGRESS NOTES
Progress Note    Admit Date: 8/15/2021  Day: 1  Diet: ADULT DIET; Regular; Low Sodium (2 gm)    CC: Dizziness/lightheadedness    Interval history: Patient seen and examined at bedside. Patient lying in bed comfortably. Denies having any vertigo, dizziness, nausea/vomiting. Denies any recent chest pain, palpitations, abdominal pain. Vitals are stable afebrile, /89, pulse 74, RR 16, SPO2 96% on room air. Medications:     Scheduled Meds:   sodium chloride flush  5-40 mL Intravenous 2 times per day    clopidogrel  75 mg Oral Daily    aspirin  81 mg Oral Daily     Continuous Infusions:   sodium chloride       PRN Meds:sodium chloride flush, sodium chloride, ondansetron **OR** ondansetron, polyethylene glycol, acetaminophen **OR** acetaminophen    Objective:   Vitals:   T-max:  Patient Vitals for the past 8 hrs:   BP Temp Temp src Pulse Resp SpO2   08/16/21 0715 (!) 157/89 98 °F (36.7 °C) Oral 74 16 96 %   08/16/21 0445 (!) 150/81 97.8 °F (36.6 °C) Oral 69 16 96 %       Intake/Output Summary (Last 24 hours) at 8/16/2021 1033  Last data filed at 8/16/2021 0933  Gross per 24 hour   Intake 360 ml   Output --   Net 360 ml         Physical Exam  Constitutional:       Appearance: Normal appearance. HENT:      Head: Normocephalic and atraumatic. Mouth/Throat:      Mouth: Mucous membranes are moist.   Eyes:      Extraocular Movements: Extraocular movements intact. Conjunctiva/sclera: Conjunctivae normal.      Pupils: Pupils are equal, round, and reactive to light. Cardiovascular:      Rate and Rhythm: Normal rate and regular rhythm. Pulmonary:      Effort: Pulmonary effort is normal.      Breath sounds: Normal breath sounds. Abdominal:      General: Bowel sounds are normal.      Palpations: Abdomen is soft. Musculoskeletal:         General: Normal range of motion. Cervical back: Normal range of motion and neck supple. Neurological:      General: No focal deficit present. Mental Status: He is alert and oriented to person, place, and time. Mental status is at baseline. Psychiatric:         Mood and Affect: Mood normal.         Behavior: Behavior normal.         Thought Content: Thought content normal.         Judgment: Judgment normal.         LABS:    CBC:   Recent Labs     08/15/21  1727 08/16/21  0547   WBC 8.9 9.7   HGB 15.0 14.3   HCT 43.0 41.8    203   MCV 89.4 88.2     Renal:    Recent Labs     08/15/21  1727 08/16/21  0547    139   K 4.0 3.9    104   CO2 29 25   BUN 14 11   CREATININE 0.9 0.8   GLUCOSE 94 110*   CALCIUM 9.4 9.2   ANIONGAP 11 10     Hepatic:   Recent Labs     08/15/21  1727   AST 18   ALT 20   BILITOT <0.2   PROT 7.7   LABALBU 4.2   ALKPHOS 103     Troponin:   Recent Labs     08/15/21  1727   TROPONINI <0.01     BNP: No results for input(s): BNP in the last 72 hours. Lipids: No results for input(s): CHOL, HDL in the last 72 hours. Invalid input(s): LDLCALCU, TRIGLYCERIDE  ABGs:  No results for input(s): PHART, KMX9TSE, PO2ART, KBH1LIW, BEART, THGBART, U0OTZTAB, OBQ8ZGJ in the last 72 hours. INR:   Recent Labs     08/15/21  1727   INR 0.98     Lactate: No results for input(s): LACTATE in the last 72 hours. Cultures:  -----------------------------------------------------------------  RAD:   CT Head WO Contrast   Final Result   1. No acute intracranial abnormality. CTA HEAD NECK W CONTRAST   Final Result      Neck      1. No significant vascular abnormality in the neck. Head      1. 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 The inferior basilar artery is supplied by a small right vertebral artery. The left vertebral artery ends in the anterior inferior cerebellar artery. Fetal origin of the posterior cerebral    arteries with reconstitution of the distal basilar artery.       Critical results reported to Dr. Danielle Chavez at 7:23 PM.      [facility]    (Results Pending)       Assessment/Plan: Vertebrobasilar insufficiency  - NIHSS zero. No need for acute intervention at this time. Will start DAPT and observe. - Neurosurgery consulted: recommend DAPT (ASA and Plavix)   - MRI brain without contrast this AM   - Q2H neurochecks   -Orthostatic blood pressure and pulse  - Bedside swallow  - Lipid panel  - A1c  -TSH  -PT OT     HTN  Will hold off on restarting home meds to allow for permissive HTN. - hold amlodipine 5 mg daily   - hold HCTZ 12.5 mg daily         Adjustment disorder with depressed mood   Patient reports difficult home situation. [facility] and [facility] 10 years ago. Had bad reaction to it and does not want any antidepressive medications. Denies SI/HI. Negative SIGECAPS.      Code Status: full code   FEN: Regular low-salt diet   PPX: lovenox   DISPO: SHELL Langley MD, PGY-1   08/16/21  10:33 AM    This patient has been staffed and discussed with Toñito Romero MD.

## 2021-08-16 NOTE — PROGRESS NOTES
I have seen the patient independently from the resident . I discussed the care with the resident. I personally reviewed the HPI, PH, FH, SH, ROS and medications. I repeated pertinent portions of the examination and reviewed the relevant imaging and laboratory data. I agree with the findings, assessment and plan as documented, with the following addendum:    Possible vertebrobasilar insufficiency- mri today/ ativan x 1 for claustrophobia. Cont asa/ plavix as per neuro (appreviate help). May benefot from outpt pt/ot and vestibular rehab.       Nasario Rubinstein, M.D.

## 2021-08-16 NOTE — CONSULTS
Neurology consult Note    Dr. Lincoln Benitez requesting this consult. CC: lightheadedness    HPI:   Mr. Mariangel Farrar is a 69 yo male with PMH of HTN and Adjustment disorder with depressed mood who presented to [facility] ED for intermitent lightheadedness over the last month. He states he has noticed these episodes after he is down eating and also for activities when he is looking down (scrolling through his phone, emails, looking down while doing the dishes. He also notices these episodes in high stressful situations including when all his family is together. He reports some nausea associated with these episodes. He states he will go lie down completely and be still to help resolve his symptoms. He denies any numbness, tingling, weakness, dysarthria, HA, vision changes. He denies any recent head trauma or falls. In the ED labs unremarkable. 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 The inferior basilar artery is supplied by a small R vertebral artery. The L vertebral artery ends in the anterior inferior cerebellar artery. Fetal origin of the posterior cerebral arteries with reconstitution of the distal basilar artery. He was admitted for further evaluation and neurology was consulted for his lightheadedness.  He does mention he had his BP regimen increased around a month ago and has noticed more episodes since then.        Past Medical History:   Diagnosis Date    Hypertension      Past Surgical History:   Procedure Laterality Date    CHOLECYSTECTOMY         HOME MEDICATIONS:  Medications Prior to Admission: amLODIPine (NORVASC) 5 MG tablet, Take 5 mg by mouth daily  hydroCHLOROthiazide (HYDRODIURIL) 25 MG tablet, Take 25 mg by mouth daily    CURRENT MEDICATIONS:    Current Facility-Administered Medications:     atorvastatin (LIPITOR) tablet 40 mg, 40 mg, Oral, Nightly, Kasia Moon MD    LORazepam (ATIVAN) injection 1 mg, 1 mg, Intravenous, Once, Kimberly TENORIO Ron Ortiz MD    sodium chloride flush 0.9 % injection 5-40 mL, 5-40 mL, Intravenous, 2 times per day, Jethro Noble MD, 10 mL at 08/16/21 0850    sodium chloride flush 0.9 % injection 5-40 mL, 5-40 mL, Intravenous, PRN, Jethro Noble MD    0.9 % sodium chloride infusion, 25 mL, Intravenous, PRN, Jethro Noble MD    ondansetron (ZOFRAN-ODT) disintegrating tablet 4 mg, 4 mg, Oral, Q8H PRN **OR** ondansetron (ZOFRAN) injection 4 mg, 4 mg, Intravenous, Q6H PRN, Jethro Noble MD    polyethylene glycol [facility]) packet 17 g, 17 g, Oral, Daily PRN, Jethro Noble MD    acetaminophen (TYLENOL) tablet 650 mg, 650 mg, Oral, Q6H PRN, 650 mg at 08/16/21 0156 **OR** acetaminophen (TYLENOL) suppository 650 mg, 650 mg, Rectal, Q6H PRN, Jethro Noble MD    clopidogrel (PLAVIX) tablet 75 mg, 75 mg, Oral, Daily, Jethro Noble MD, 75 mg at 08/16/21 0850    aspirin EC tablet 81 mg, 81 mg, Oral, Daily, Jethro Noble MD, 81 mg at 08/16/21 0850      ROS:   Constitutional- No weight loss or fevers   Eyes- No diplopia. No photophobia. Ears/nose/throat- No dysphagia. No Dysarthria   Cardiovascular- No palpitations. No chest pain   Respiratory- No dyspnea. No Cough   Gastrointestinal- No Abdominal pain. No Vomiting. Genitourinary- No incontinence. No urinary retention   Musculoskeletal- No myalgia. No arthralgia   Skin- No rash. No easy bruising. Psychiatric- No depression. No anxiety   Endocrine- No diabetes. No thyroid issues. Hematologic- No bleeding difficulty. No fatigue   Neurologic- No weakness. No Headache. Constitutional  BP (!) 157/89   Pulse 74   Temp 98 °F (36.7 °C) (Oral)   Resp 16   SpO2 96%     General Alert, no distress, well-nourished  Cardiovascular: Rate regular.  No murmurs  Respiratory: No adventitious breath sounds    Neurologic  Mental status:   orientation to person, place, time, situation   Attention intact as able to attend well to the exam     Language fluent in conversation   Comprehension intact; follows simple commands    Cranial nerves:   CN2: Visual Fields full w/o extinction on confrontational testing  Fundoscopic Exam   CN 3,4,6: pupils equal and reactive to light, extraocular muscles intact,  CN5: facial sensation symmetric   CN7:face symmetri  CN8: hearing symmetric to voice  CN9: palate elevated symmetrically  CN11: trap full strength on shoulder shrug  CN12: tongue midline with protrusion  Motor Exam:   R  L    Deltoid 5  5   Biceps 5 5   Triceps 5 5   Wrist extension  5 5   Interossei 5 5      R  L    Hip flexion  5  5   Hip extension  5 5   Knee flexion  5 5   Knee extension  5 5   Ankle dorsiflexion  5 5   Ankle plantar flexion  5 5       Deep tendon reflexes:    R  L    Biceps  2  2   Triceps  2 2   Brachioradialis  2 2   Patellar  2 2   Achilles  2 2   Toes 2 2     Sensory: light touch intact and symmetric in all 4 extremities. No sensory extinction on double simultaneous stimulation  Cerebellar/coordination: finger nose finger normal without ataxia  Tone: normal in all 4 extremities  Gait: normal gait, normal tandem      Images:  CT head wo contrast: 8/15/21   No acute intracranial abnormality. CTA head and neck: 8/15/21  Neck   1. No significant vascular abnormality in the neck. Head    1. 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 The inferior basilar artery is supplied by a small right vertebral artery. The left vertebral artery ends in the anterior inferior cerebellar artery. Fetal origin of the posterior cerebral arteries with reconstitution of the distal basilar artery. Brain MRI pending      Assessment:  70 y.o M presenting for episodes of lightheadedness over the last month. Episodes are triggered by looking down and high stressful situations. Head CT was unremarkable. 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 The inferior basilar artery is supplied by a small right vertebral artery.  The left vertebral artery ends in the anterior inferior cerebellar artery. Fetal origin of the posterior cerebral arteries with reconstitution of the distal basilar artery. Started on DAPT (ASA, Plavis)    Plan:  Agree with DAPT  Pending Brain MRI  Neurosurgery recs appreciated  I would recommend keeping his BP in the 332 systolic range given he has remained asymptomatic at this time.  Overtreating his BP could result in exacerbation of his symptoms with his short segment basilar artery occlusion  If any changes in neuro exam get STAT H&N CTA and call Neurosurgery    Dima Cobb MD, PGY2  Neurology & Neurocritical Care   Neurology Line: [phone]  [employer]: [facility] NPs

## 2021-08-16 NOTE — PLAN OF CARE
Problem: HEMODYNAMIC STATUS  Goal: Patient has stable vital signs and fluid balance  Outcome: Ongoing   VSS with the exception of elevated B/P. MD's made aware. We are holding for permissive hypertension. Problem: Falls - Risk of:  Goal: Will remain free from falls  Outcome: Ongoing   Pt remains free from injury during this shift. Pt is up x 1 person assist. Encourage pt to call when needing assistance. Call light is in reach, bed alarm activated for safety, bed locked and in lowest position. Will continue to monitor.

## 2021-08-17 LAB
ANION GAP SERPL CALCULATED.3IONS-SCNC: 11 MMOL/L (ref 3–16)
BUN BLDV-MCNC: 15 MG/DL (ref 7–20)
CALCIUM SERPL-MCNC: 9.2 MG/DL (ref 8.3–10.6)
CHLORIDE BLD-SCNC: 102 MMOL/L (ref 99–110)
CO2: 23 MMOL/L (ref 21–32)
CREAT SERPL-MCNC: 0.8 MG/DL (ref 0.8–1.3)
ESTIMATED AVERAGE GLUCOSE: 116.9 MG/DL
GFR AFRICAN AMERICAN: >60
GFR NON-AFRICAN AMERICAN: >60
GLUCOSE BLD-MCNC: 101 MG/DL (ref 70–99)
HBA1C MFR BLD: 5.7 %
HCT VFR BLD CALC: 43.5 % (ref 40.5–52.5)
HEMOGLOBIN: 14.9 G/DL (ref 13.5–17.5)
LV EF: 58 %
LVEF MODALITY: NORMAL
MCH RBC QN AUTO: 30.2 PG (ref 26–34)
MCHC RBC AUTO-ENTMCNC: 34.3 G/DL (ref 31–36)
MCV RBC AUTO: 88.2 FL (ref 80–100)
PDW BLD-RTO: 13.5 % (ref 12.4–15.4)
PLATELET # BLD: 219 K/UL (ref 135–450)
PMV BLD AUTO: 7.3 FL (ref 5–10.5)
POTASSIUM REFLEX MAGNESIUM: 4.2 MMOL/L (ref 3.5–5.1)
RBC # BLD: 4.93 M/UL (ref 4.2–5.9)
SODIUM BLD-SCNC: 136 MMOL/L (ref 136–145)
WBC # BLD: 9.4 K/UL (ref 4–11)

## 2021-08-17 PROCEDURE — 2580000003 HC RX 258: Performed by: INTERNAL MEDICINE

## 2021-08-17 PROCEDURE — 97530 THERAPEUTIC ACTIVITIES: CPT

## 2021-08-17 PROCEDURE — 97165 OT EVAL LOW COMPLEX 30 MIN: CPT

## 2021-08-17 PROCEDURE — 6360000002 HC RX W HCPCS: Performed by: INTERNAL MEDICINE

## 2021-08-17 PROCEDURE — 2060000000 HC ICU INTERMEDIATE R&B

## 2021-08-17 PROCEDURE — 36415 COLL VENOUS BLD VENIPUNCTURE: CPT

## 2021-08-17 PROCEDURE — 6370000000 HC RX 637 (ALT 250 FOR IP): Performed by: STUDENT IN AN ORGANIZED HEALTH CARE EDUCATION/TRAINING PROGRAM

## 2021-08-17 PROCEDURE — 80048 BASIC METABOLIC PNL TOTAL CA: CPT

## 2021-08-17 PROCEDURE — 97162 PT EVAL MOD COMPLEX 30 MIN: CPT

## 2021-08-17 PROCEDURE — 85027 COMPLETE CBC AUTOMATED: CPT

## 2021-08-17 PROCEDURE — C8929 TTE W OR WO FOL WCON,DOPPLER: HCPCS

## 2021-08-17 PROCEDURE — 97116 GAIT TRAINING THERAPY: CPT

## 2021-08-17 PROCEDURE — 2580000003 HC RX 258: Performed by: STUDENT IN AN ORGANIZED HEALTH CARE EDUCATION/TRAINING PROGRAM

## 2021-08-17 RX ORDER — 0.9 % SODIUM CHLORIDE 0.9 %
1000 INTRAVENOUS SOLUTION INTRAVENOUS ONCE
Status: COMPLETED | OUTPATIENT
Start: 2021-08-17 | End: 2021-08-17

## 2021-08-17 RX ADMIN — CLOPIDOGREL BISULFATE 75 MG: 75 TABLET ORAL at 10:37

## 2021-08-17 RX ADMIN — Medication 10 ML: at 19:50

## 2021-08-17 RX ADMIN — ASPIRIN 81 MG: 81 TABLET, COATED ORAL at 10:37

## 2021-08-17 RX ADMIN — ACETAMINOPHEN 650 MG: 325 TABLET ORAL at 11:21

## 2021-08-17 RX ADMIN — LORAZEPAM 1 MG: 2 INJECTION INTRAMUSCULAR; INTRAVENOUS at 09:00

## 2021-08-17 RX ADMIN — ATORVASTATIN CALCIUM 40 MG: 40 TABLET, FILM COATED ORAL at 19:50

## 2021-08-17 RX ADMIN — SODIUM CHLORIDE 1000 ML: 9 INJECTION, SOLUTION INTRAVENOUS at 10:38

## 2021-08-17 RX ADMIN — Medication 10 ML: at 10:37

## 2021-08-17 ASSESSMENT — PAIN DESCRIPTION - PROGRESSION: CLINICAL_PROGRESSION: NOT CHANGED

## 2021-08-17 ASSESSMENT — PAIN DESCRIPTION - DESCRIPTORS: DESCRIPTORS: ACHING

## 2021-08-17 ASSESSMENT — PAIN - FUNCTIONAL ASSESSMENT: PAIN_FUNCTIONAL_ASSESSMENT: ACTIVITIES ARE NOT PREVENTED

## 2021-08-17 ASSESSMENT — PAIN DESCRIPTION - FREQUENCY: FREQUENCY: INTERMITTENT

## 2021-08-17 ASSESSMENT — PAIN DESCRIPTION - PAIN TYPE: TYPE: ACUTE PAIN

## 2021-08-17 ASSESSMENT — PAIN DESCRIPTION - LOCATION: LOCATION: GENERALIZED

## 2021-08-17 ASSESSMENT — PAIN SCALES - GENERAL
PAINLEVEL_OUTOF10: 0
PAINLEVEL_OUTOF10: 4

## 2021-08-17 ASSESSMENT — PAIN DESCRIPTION - ONSET: ONSET: ON-GOING

## 2021-08-17 NOTE — PROGRESS NOTES
Occupational Therapy   Occupational Therapy Initial Assessment  Date: 2021   Patient Name: Darryl Torre  MRN: [id number]     : 1950    Date of Service: 2021    Discharge Recommendations: Daryrl Torre scored a 19/24 on the AM-PAC ADL Inpatient form. Current research shows that an AM-PAC score of 18 or greater is typically associated with a discharge to the patient's home setting. Based on the patient's AM-PAC score, and their current ADL deficits, it is recommended that the patient have 2-3 sessions per week of Occupational Therapy at d/c to increase the patient's independence. At this time, this patient demonstrates the endurance and safety to discharge home with (home vs OP services) and a follow up treatment frequency of 2-3x/wk. Please see assessment section for further patient specific details. If patient discharges prior to next session this note will serve as a discharge summary. Please see below for the latest assessment towards goals. OT Equipment Recommendations  Equipment Needed: No    Assessment   Performance deficits / Impairments: Decreased functional mobility ; Decreased ADL status; Decreased endurance;Decreased balance  Assessment: Pt from home with spouse, ind pta. Admit with basilar artery occlusion and dizziness. Pt currently requires CGA to SBA with all fx mobility and transfers limited by dizziness/lightheaded feeling, no LOB. Completing full loop in hallway this date. ADLs with SBA. Pt would benefit from cont OT while in acute care.   Treatment Diagnosis: impaired mobility, transfers, ADL  Decision Making: Low Complexity  OT Education: OT Role;Plan of Care  Patient Education: verb understanding  Barriers to Learning: none  REQUIRES OT FOLLOW UP: Yes  Activity Tolerance  Activity Tolerance: Patient Tolerated treatment well;Treatment limited secondary to medical complications (free text)  Activity Tolerance: dizziness  Safety Devices  Safety Devices in place: Yes  Type of devices: All fall risk precautions in place;Gait belt;Patient at risk for falls;Call light within reach; Chair alarm in place; Left in chair;Nurse notified           Patient Diagnosis(es): The primary encounter diagnosis was Vertigo. A diagnosis of Vertebrobasilar insufficiency was also pertinent to this visit. has a past medical history of Hypertension. has a past surgical history that includes Cholecystectomy. Treatment Diagnosis: impaired mobility, transfers, ADL      Restrictions  Position Activity Restriction  Other position/activity restrictions: up with assist    Subjective   General  Chart Reviewed: Yes  Additional Pertinent Hx: Pt admitted to ED with c/o dizziness. Head/ Neck CT: Short segment occlusion of the distal basilar artery. PMHx includes: HTN. MRI No acute intracranial abnormality.   Referring Practitioner: Paulette Kendrick MD  Diagnosis: basilar artery occlusion  Subjective  Subjective: Pt in chair upon arrival, agreeable to session, reporting no pain, c/o dizziness this session  Patient Currently in Pain: Denies  Pain Assessment  Pain Assessment: 0-10  Pain Level: 0  Vital Signs  Temp: 97.7 °F (36.5 °C)  Temp Source: Oral  Pulse: 69  Heart Rate Source: Monitor  Resp: 16  BP: 133/84  BP Location: Left upper arm  Patient Position: Supine  Level of Consciousness: Alert (0)  MEWS Score: 1  Patient Currently in Pain: Denies  Oxygen Therapy  SpO2: 96 %  Pulse Oximeter Device Mode: Intermittent  Pulse Oximeter Device Location: Finger  O2 Device: None (Room air)  Social/Functional History  Social/Functional History  Lives With: Spouse (+ dtr and grandson)  Type of Home: House  Home Layout: Able to Live on Main level with bedroom/bathroom, Laundry in basement, Work area in basement  Home Access: Stairs to enter with rails  Entrance Stairs - Number of Steps: 1 + 1 DYLAN  Bathroom Shower/Tub: Tub/Shower unit  Bathroom Toilet: Standard  Bathroom Equipment: Shower chair  Home Equipment: [employer], Rolling walker, Reacher (BSC; pt does not use any DME)  ADL Assistance: Independent  Homemaking Assistance: Independent  Ambulation Assistance: Independent  Transfer Assistance: Independent  Active : Yes  Occupation: Full time employment  Type of occupation: life insurance       Objective        Orientation  Overall Orientation Status: Within Normal Limits     Balance  Sitting Balance: Supervision  Standing Balance: Contact guard assistance (to SBA)  Standing Balance  Time: ~10 min  Activity: mobility in room, hallway ~300'  Functional Mobility  Functional - Mobility Device: No device  Activity: To/from bathroom; Other  Assist Level: Contact guard assistance  Functional Mobility Comments: CGA to SBA, no LOB, touching wall rail occ for stability, c/o dizziness  Toilet Transfers  Toilet - Technique: Ambulating  Equipment Used: Standard toilet  Toilet Transfer: Contact guard assistance  ADL  Feeding: Independent  Grooming:  (declined)  UE Dressing: Stand by assistance  LE Dressing: Stand by assistance  Toileting: Stand by assistance  Additional Comments: pt reporting full shower earlier, declining additional ADLs        Bed mobility  Comment: in chair upon arrival, left in chair end of session  Transfers  Sit to stand: Contact guard assistance;Stand by assistance  Stand to sit: Contact guard assistance;Stand by assistance     Cognition  Overall Cognitive Status: WFL        Sensation  Overall Sensation Status: WFL (pt with vague c/o \"whole body dizziness\" states \"it might be tingling\" after amb, sensation intact to light touch to LEs.)        LUE AROM (degrees)  LUE AROM : WFL  Left Hand AROM (degrees)  Left Hand AROM: WFL  RUE AROM (degrees)  RUE AROM : WFL  Right Hand AROM (degrees)  Right Hand AROM: WFL  LUE Strength  Gross LUE Strength: WFL  RUE Strength  Gross RUE Strength: WFL                   Plan   Plan  Times per week: 5-7  Times per day: Daily  Current Treatment Recommendations: Functional Mobility Training, Endurance Training, Safety Education & Training, Patient/Caregiver Education & Training, Self-Care / ADL      AM-PAC Score        AM-PAC Inpatient Daily Activity Raw Score: 19 (08/17/21 1432)  AM-PAC Inpatient ADL T-Scale Score : 40.22 (08/17/21 1432)  ADL Inpatient CMS 0-100% Score: 42.8 (08/17/21 1432)  ADL Inpatient CMS G-Code Modifier : CK (08/17/21 1432)    Goals  Short term goals  Time Frame for Short term goals: d/c  Short term goal 1: sit<>stand SPVN  Short term goal 2: Fx mobility SPVN  Short term goal 3: grooming SPVN in stance at sink  Short term goal 4: UB/LB dressing SPVN       Therapy Time   Individual Concurrent Group Co-treatment   Time In 4214         Time Out 1412         Minutes 24              Timed Code Treatment Minutes:   14    Total Treatment Minutes:  24    Sarah Goncalves, KRISTAN, OTR/L

## 2021-08-17 NOTE — PROGRESS NOTES
Physical Therapy    Facility/Department: [facility]  Initial Assessment / treatment    NAME: Evens Mcneal  : 1950  MRN: [id number]    Date of Service: 2021    Discharge Recommendations: Evens Mcneal scored a 19/24 on the AM-PAC short mobility form. Current research shows that an AM-PAC score of 18 or greater is typically associated with a discharge to the patient's home setting. Based on the patient's AM-PAC score and their current functional mobility deficits, it is recommended that the patient have 2-3 sessions per week of Physical Therapy at d/c to increase the patient's independence. At this time, this patient demonstrates the endurance and safety to discharge home with home services and a follow up treatment frequency of 2-3x/wk. Please see assessment section for further patient specific details. If patient discharges prior to next session this note will serve as a discharge summary. Please see below for the latest assessment towards goals. PT Equipment Recommendations  Equipment Needed: No    Assessment   Body structures, Functions, Activity limitations: Decreased functional mobility   Assessment: Pt is 70 y.o. male admit with basilar artery occlusion. Pt demos symmetrical LE strength, able to transfer, ambulate, and negotiate 4 steps with CGA to SBA. After mobility in power, pt reports increased dizziness. Pt was returned to supine position in bed. Neuro MD in room and aware. Sx improved although did not resolve after several minutes in supine. Will follow while in hospital.  Rec 24hr assist initially at d/c. Treatment Diagnosis: impaired gait and transfers  Prognosis: Good  Decision Making: Medium Complexity  PT Education: PT Role;Functional Mobility Training  Patient Education: pt demonstrates understanding  REQUIRES PT FOLLOW UP: Yes       Patient Diagnosis(es): The primary encounter diagnosis was Vertigo.  A diagnosis of Vertebrobasilar insufficiency was also pertinent to this visit. has a past medical history of Hypertension. has a past surgical history that includes Cholecystectomy. Restrictions  Position Activity Restriction  Other position/activity restrictions: up with assist  Vision/Hearing  Vision: Impaired  Vision Exceptions: Wears glasses at all times  Hearing: Within functional limits     Subjective  General  Chart Reviewed: Yes  Additional Pertinent Hx: PMH: HTN, depression. Pt admitted with bilat hand swelling & episodes of dizziness. CTA Head/Neck shows mid/distal basilar artery occlusion versus critical stenosis. MRI (-) acute  Referring Practitioner: Andreina Singh MD  Diagnosis: dizziness, basilar artery occlusion  Subjective  Subjective: Pt found supine in bed. Pleasant and agreeable to PT.   Pain Screening  Patient Currently in Pain: Denies       Orientation  Orientation  Overall Orientation Status: Within Normal Limits  Social/Functional History  Social/Functional History  Lives With: Spouse (+ dtr and grandson)  Type of Home: House  Home Layout: Able to Live on Main level with bedroom/bathroom, Laundry in basement, Work area in basement  Home Access: Stairs to enter with rails  Entrance Stairs - Number of Steps: 1 + 1 DYLAN  Bathroom Shower/Tub: Tub/Shower unit  Bathroom Toilet: Standard  Bathroom Equipment: Shower chair  Home Equipment: Cane, Rolling walker, Reacher (BSC; pt does not use any DME)  ADL Assistance: Independent  Homemaking Assistance: Independent  Ambulation Assistance: Independent  Transfer Assistance: Independent  Active : Yes  Occupation: Full time employment  Type of occupation: life insurance  Cognition        Objective          AROM RLE (degrees)  RLE AROM: WFL  AROM LLE (degrees)  LLE AROM : WFL  Strength RLE  Strength RLE: WFL  Strength LLE  Strength LLE: WFL     Sensation  Overall Sensation Status: WFL (pt with vague c/o \"whole body dizziness\" states \"it might be tingling\" after amb, sensation intact to light touch to LEs.)  Bed mobility  Supine to Sit: Stand by assistance  Sit to Supine: Supervision  Scooting: Supervision  Transfers  Sit to Stand: Contact guard assistance  Stand to sit: Contact guard assistance  Ambulation  Ambulation?: Yes  Ambulation 1  Device: No Device  Assistance: Contact guard assistance (progressing to SBA)  Quality of Gait: min B hip ER, decreased stride length, fairly steady without overt LOB, appropriate kinga  Distance: 150 ft; 2 ft  Stairs/Curb  Stairs?: Yes (negotiated 2 x 2 steps with rail and SBA - pt reports increased dizziness post stair negotiation)     Balance  Sitting - Static: Good  Sitting - Dynamic: Good  Standing - Static: Good  Standing - Dynamic: Fair      Treatment included gait and transfer training, pt education.   Plan   Plan  Times per week: 2-5  Current Treatment Recommendations: Transfer Training, Patient/Caregiver Education & Training, Balance Training, Gait Training, Stair training, Functional Mobility Training  Safety Devices  Type of devices: Call light within reach, Left in bed, Bed alarm in place, Nurse notified, Gait belt    G-Code       OutComes Score                                                  AM-PAC Score  AM-PAC Inpatient Mobility Raw Score : 19 (08/17/21 1131)  AM-PAC Inpatient T-Scale Score : 45.44 (08/17/21 1131)  Mobility Inpatient CMS 0-100% Score: 41.77 (08/17/21 1131)  Mobility Inpatient CMS G-Code Modifier : CK (08/17/21 1131)          Goals  Short term goals  Time Frame for Short term goals: discharge  Short term goal 1: Pt will transfer sit <--> stand with supervision  Short term goal 2: Pt will amb 300 ft with supervision  Short term goal 3: Pt will negotiate at least 2 steps with supervision without increased dizziness  Patient Goals   Patient goals : return home       Therapy Time   Individual Concurrent Group Co-treatment   Time In 0925         Time Out 1005         Minutes 40                 Timed Code Treatment Minutes:  25    Total Treatment Minutes:  40    If patient is discharged prior to next treatment, this note will serve as the discharge summary.   Rosalva Martínez, PT, DPT  [phone]

## 2021-08-17 NOTE — PROGRESS NOTES
Pt is A&O, VSS. Tolerating diet well. Voiding adequately per bathroom. Up x1 SBA. NIH of 0, neuro checks unchanged. Denies pain and n/v. All fall precautions in place. Call light within reach.

## 2021-08-17 NOTE — PROGRESS NOTES
Occupational Therapy  Hold/Attempt    Attempt to see pt this AM for OT eval. Per RN and MD hold therapy at this time for one hour while bolus is running. Will follow up as treatment schedule allows.      Nabila Chavez, KRISTAN, OTR/L

## 2021-08-17 NOTE — PROGRESS NOTES
NEUROSURGERY POST-OP PROGRESS NOTE    Patient Name: Evens Mcneal YOB: 1950   Sex: Male Age: 70 yrs     Medical Record Number: [id number] Acct Number: [de-identified]   Room Number: 9135/3195-68 Hospital Day: Hospital Day: 3     Subjective:pt feeling good today. Denies any dizziness. Objective:    VITAL SIGNS   BP (!) 158/83   Pulse 67   Temp 97.8 °F (36.6 °C) (Oral)   Resp 16   SpO2 94%    Height     Weight          Allergies Allergies   Allergen Reactions    Lisinopril      Other reaction(s): Drowsy    Meperidine Rash      NPO Status ADULT DIET; Regular; Low Sodium (2 gm)   Isolation No active isolations     LABS   Basic Metabolic Profile Recent Labs     08/15/21  1727 08/16/21  0547 08/17/21  0532    139 136    104 102   CO2 29 25 23   BUN 14 11 15   CREATININE 0.9 0.8 0.8   GLUCOSE 94 110* 101*      Complete Blood Count Recent Labs     08/15/21  1727 08/16/21  0547 08/17/21  0532   WBC 8.9 9.7 9.4   RBC 4.82 4.73 4.93      Coagulation Studies Recent Labs     08/15/21  1727   INR 0.98        MEDICATIONS   Inpatient Medications     atorvastatin, 40 mg, Oral, Nightly    sodium chloride flush, 5-40 mL, Intravenous, 2 times per day    clopidogrel, 75 mg, Oral, Daily    aspirin, 81 mg, Oral, Daily   Infusions    sodium chloride        Antibiotics   Recent Abx Admin      No antibiotic orders with administrations found.                  Neurologic Exam:  Mental status: awake and alert and oriented x4    Cranial Nerves:  II: Visual acuity not tested, visual fields full, denies new visual changes / diplopia  III, IV, VI: PERRL, 3 mm bilaterally, EOMI, no nystagmus noted  V: Facial sensation intact bilaterally to touch  VII: Face symmetric  VIII: Hearing intact bilaterally to spoken voice  IX: Palate movement equal above assessment and plan. Electronically signed by:  ROBERTA Fung CNP, 8/17/2021 12:37 PM   Neurosurgery Nurse Practitioner  [phone]

## 2021-08-17 NOTE — PROGRESS NOTES
Pt is alert and oriented. VSS with the exception of elevated B/P. Pt received his MRI this evening. Gave 1 mg Ativan prior to the MRI. Pt currently sleepy. Will continue to monitor pt Q 2 hrs. For neuro checks.

## 2021-08-17 NOTE — PROGRESS NOTES
Progress Note    Admit Date: 8/15/2021  Day: 1  Diet: ADULT DIET; Regular; Low Sodium (2 gm)    CC: Dizziness/lightheadedness    Interval history: Patient seen and examined at bedside. Patient lying in bed comfortably. Denies having any vertigo, dizziness, nausea/vomiting. Denies any recent chest pain, palpitations, abdominal pain. Vitals are stable afebrile, /75, pulse 78, RR 16, SPO2 95% on room air. Medications:     Scheduled Meds:   sodium chloride  1,000 mL Intravenous Once    atorvastatin  40 mg Oral Nightly    sodium chloride flush  5-40 mL Intravenous 2 times per day    clopidogrel  75 mg Oral Daily    aspirin  81 mg Oral Daily     Continuous Infusions:   sodium chloride       PRN Meds:sodium chloride flush, sodium chloride, ondansetron **OR** ondansetron, polyethylene glycol, acetaminophen **OR** acetaminophen    Objective:   Vitals:   T-max:  Patient Vitals for the past 8 hrs:   BP Temp Temp src Pulse Resp SpO2   08/17/21 0715 112/70 98.3 °F (36.8 °C) Oral 78 16 96 %   08/17/21 0300 (!) 158/75 98.3 °F (36.8 °C) Oral 68 16 95 %       Intake/Output Summary (Last 24 hours) at 8/17/2021 1017  Last data filed at 8/17/2021 0835  Gross per 24 hour   Intake 960 ml   Output --   Net 960 ml         Physical Exam  Constitutional:       Appearance: Normal appearance. HENT:      Head: Normocephalic and atraumatic. Mouth/Throat:      Mouth: Mucous membranes are moist.   Eyes:      Extraocular Movements: Extraocular movements intact. Conjunctiva/sclera: Conjunctivae normal.      Pupils: Pupils are equal, round, and reactive to light. Cardiovascular:      Rate and Rhythm: Normal rate and regular rhythm. Pulmonary:      Effort: Pulmonary effort is normal.      Breath sounds: Normal breath sounds. Abdominal:      General: Bowel sounds are normal.      Palpations: Abdomen is soft. Musculoskeletal:         General: Normal range of motion.       Cervical back: Normal range of motion and neck supple. Neurological:      General: No focal deficit present. Mental Status: He is alert and oriented to person, place, and time. Mental status is at baseline. Psychiatric:         Mood and Affect: Mood normal.         Behavior: Behavior normal.         Thought Content: Thought content normal.         Judgment: Judgment normal.         LABS:    CBC:   Recent Labs     08/15/21  1727 08/16/21  0547 08/17/21  0532   WBC 8.9 9.7 9.4   HGB 15.0 14.3 14.9   HCT 43.0 41.8 43.5    203 219   MCV 89.4 88.2 88.2     Renal:    Recent Labs     08/15/21  1727 08/16/21  0547 08/17/21  0532    139 136   K 4.0 3.9 4.2    104 102   CO2 29 25 23   BUN 14 11 15   CREATININE 0.9 0.8 0.8   GLUCOSE 94 110* 101*   CALCIUM 9.4 9.2 9.2   ANIONGAP 11 10 11     Hepatic:   Recent Labs     08/15/21  1727   AST 18   ALT 20   BILITOT <0.2   PROT 7.7   LABALBU 4.2   ALKPHOS 103     Troponin:   Recent Labs     08/15/21  1727   TROPONINI <0.01     BNP: No results for input(s): BNP in the last 72 hours. Lipids:   Recent Labs     08/16/21  0547   CHOL 158   HDL 29*     ABGs:  No results for input(s): PHART, OBJ3EQV, PO2ART, WJH1BRE, BEART, THGBART, N5YAKQCR, DXJ6HLW in the last 72 hours. INR:   Recent Labs     08/15/21  1727   INR 0.98     Lactate: No results for input(s): LACTATE in the last 72 hours. Cultures:  -----------------------------------------------------------------  RAD:   MRI BRAIN WO CONTRAST   Final Result      1. No acute intracranial abnormality. CT Head WO Contrast   Final Result   1. No acute intracranial abnormality. CTA HEAD NECK W CONTRAST   Final Result      Neck      1. No significant vascular abnormality in the neck. Head      1. 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 The inferior basilar artery is supplied by a small right vertebral artery. The left vertebral artery ends in the anterior inferior cerebellar artery.  Fetal origin of the posterior cerebral    arteries with reconstitution of the distal basilar artery. Critical results reported to Dr. Yanci Felder at 7:23 PM.          Assessment/Plan:   Basilar artery stenosis    - NIHSS zero. No need for acute intervention at this time. Will start DAPT and observe. - Neurosurgery consulted: recommend DAPT (ASA and Plavix)   - statin   - MRI brain without contrast : non-revealing   - Q2H neurochecks   - discontinue Orthostatic blood pressure and pulse  - Bedside swallow normal   - Lipid panel  - A1c : pending   -TSH : wnl   -PT/OT: ending   - f/u echo   - tele   - If any changes in neuro exam get STAT H&N CTA and call Neurosurgery     HTN  Will hold off on restarting home meds to allow for permissive HTN. - discont amlodipine   - discont  HCTZ         Adjustment disorder with depressed mood   Patient reports difficult home situation. [facility] and [facility] medication 10 years ago. Had bad reaction to it and does not want any antidepressive medications. Denies SI/HI. Negative SIGECAPS. Code Status: full code   FEN: Regular low-salt diet   PPX: lovenox   DISPO: [facility]     Na Roberts MD, PGY-1   08/17/21  10:17 AM    This patient has been staffed and discussed with Mark Odonnell MD.     Patient seen and examined, labs and imaging studies reviewed, agree with assessment and plan as outlined above. Continue with current care and plan. Discussed case with patients nurse, discussed case with care team, discussed plan. Patient became orhtostatic when he was walking - discussed mri resultes with patient and family. Monitor bp correct fluid status, after getting about 1/3 of the liter his symptoms improved bp is better too. Continue with close monitoring and neuro checks.      MD Clark Barnhart

## 2021-08-17 NOTE — PROGRESS NOTES
Neurology consult Note    Dr. Lesia Bourgeois requesting this consult. CC: lightheadedness    Interval History:  Patient just back from lap around hallway and climbing he stairs. He does report dizziness with walking the steps. He denies any other symptoms but does report his symptoms are improving as his lying still on his back in his hospital bed. Denies palpitations, chest pain, sob, nausea, vomiting, diarrhea, fever or chills. HPI:   Mr. Ernie Hoyt is a 69 yo male with PMH of HTN and Adjustment disorder with depressed mood who presented to [facility] for intermitent lightheadedness over the last month. He states he has noticed these episodes after he is down eating and also for activities when he is looking down (scrolling through his phone, emails, looking down while doing the dishes. He also notices these episodes in high stressful situations including when all his family is together. He reports some nausea associated with these episodes. He states he will go lie down completely and be still to help resolve his symptoms. He denies any numbness, tingling, weakness, dysarthria, HA, vision changes. He denies any recent head trauma or falls. In the ED labs unremarkable. 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 The inferior basilar artery is supplied by a small R vertebral artery. The L vertebral artery ends in the anterior inferior cerebellar artery. Fetal origin of the posterior cerebral arteries with reconstitution of the distal basilar artery. He was admitted for further evaluation and neurology was consulted for his lightheadedness. He does mention he had his BP regimen increased around a month ago and has noticed more episodes since then.      Past Medical History:   Diagnosis Date    Hypertension      Past Surgical History:   Procedure Laterality Date    CHOLECYSTECTOMY         HOME MEDICATIONS:  Medications Prior to Admission: amLODIPine (NORVASC) 5 MG tablet, Take 5 mg by mouth daily  hydroCHLOROthiazide (HYDRODIURIL) 25 MG tablet, Take 25 mg by mouth daily    CURRENT MEDICATIONS:    Current Facility-Administered Medications:     atorvastatin (LIPITOR) tablet 40 mg, 40 mg, Oral, Nightly, Sara Hendricks MD, 40 mg at 08/16/21 2035    sodium chloride flush 0.9 % injection 5-40 mL, 5-40 mL, Intravenous, 2 times per day, Jethro Noble MD, 10 mL at 08/16/21 0850    sodium chloride flush 0.9 % injection 5-40 mL, 5-40 mL, Intravenous, PRN, Jethro Noble MD    0.9 % sodium chloride infusion, 25 mL, Intravenous, PRN, Jethro Noble MD    ondansetron (ZOFRAN-ODT) disintegrating tablet 4 mg, 4 mg, Oral, Q8H PRN **OR** ondansetron (ZOFRAN) injection 4 mg, 4 mg, Intravenous, Q6H PRN, Jethro Noble MD    polyethylene glycol (GLYCOLAX) packet 17 g, 17 g, Oral, Daily PRN, Jethro Noble MD    acetaminophen (TYLENOL) tablet 650 mg, 650 mg, Oral, Q6H PRN, 650 mg at 08/16/21 0156 **OR** acetaminophen (TYLENOL) suppository 650 mg, 650 mg, Rectal, Q6H PRN, Jethro Noble MD    clopidogrel (PLAVIX) tablet 75 mg, 75 mg, Oral, Daily, Jethro Noble MD, 75 mg at 08/16/21 0850    aspirin EC tablet 81 mg, 81 mg, Oral, Daily, Jethro Noble MD, 81 mg at 08/16/21 0850      ROS:   Constitutional- No weight loss or fevers   Eyes- No diplopia. No photophobia. Ears/nose/throat- No dysphagia. No Dysarthria   Cardiovascular- No palpitations. No chest pain   Respiratory- No dyspnea. No Cough   Gastrointestinal- No Abdominal pain. No Vomiting. Genitourinary- No incontinence. No urinary retention   Musculoskeletal- No myalgia. No arthralgia   Skin- No rash. No easy bruising. Psychiatric- No depression. No anxiety   Endocrine- No diabetes. No thyroid issues. Hematologic- No bleeding difficulty. No fatigue   Neurologic- No weakness. No Headache.     Constitutional  /70   Pulse 78   Temp 98.3 °F (36.8 °C) (Oral)   Resp 16   SpO2 96%     General Alert, no distress, well-nourished  Cardiovascular: Rate regular. No murmurs  Respiratory: No adventitious breath sounds    Neurologic  Mental status:   orientation to person, place, time, situation   Attention intact as able to attend well to the exam     Language fluent in conversation   Comprehension intact; follows simple commands    Cranial nerves:   CN2: Visual Fields full w/o extinction on confrontational testing  Fundoscopic Exam   CN 3,4,6: pupils equal and reactive to light, extraocular muscles intact,  CN5: facial sensation symmetric   CN7:face symmetri  CN8: hearing symmetric to voice  CN9: palate elevated symmetrically  CN11: trap full strength on shoulder shrug  CN12: tongue midline with protrusion  Motor Exam:   R  L    Deltoid 5  5   Biceps 5 5   Triceps 5 5   Wrist extension  5 5   Interossei 5 5      R  L    Hip flexion  5  5   Hip extension  5 5   Knee flexion  5 5   Knee extension  5 5   Ankle dorsiflexion  5 5   Ankle plantar flexion  5 5       Deep tendon reflexes:    R  L    Biceps  2  2   Triceps  2 2   Brachioradialis  2 2   Patellar  2 2   Achilles  2 2   Toes 2 2     Sensory: light touch intact and symmetric in all 4 extremities. No sensory extinction on double simultaneous stimulation  Cerebellar/coordination: finger nose finger normal without ataxia  Tone: normal in all 4 extremities  Gait: normal gait, normal tandem      Images:  CT head wo contrast: 8/15/21   No acute intracranial abnormality. CTA head and neck: 8/15/21  Neck   1. No significant vascular abnormality in the neck. Head    1. 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 The inferior basilar artery is supplied by a small right vertebral artery. The left vertebral artery ends in the anterior inferior cerebellar artery. Fetal origin of the posterior cerebral arteries with reconstitution of the distal basilar artery. Brain MRI: 8/16/21  No acute intracranial abnormality.       Assessment:  70 y.o M presenting for episodes of lightheadedness over the last month. Episodes are triggered by looking down and high stressful situations. Head CT was unremarkable. 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 The inferior basilar artery is supplied by a small right vertebral artery. The left vertebral artery ends in the anterior inferior cerebellar artery. Fetal origin of the posterior cerebral arteries with reconstitution of the distal basilar artery. Started on DAPT (ASA, Plavis)    Plan:  Given he was on ASA 81 mg daily prior to this event will recommend single agent Plvaix from neurology standpoint.    Brain MRI without acute abnormality  Neurosurgery recs appreciated  Primary ordering ECHO to evaluate cardiac function  Would defer BP recs to primary team given findings on ECHO  If any changes in neuro exam get STAT H&N CTA and call Neurosurgery    Ezekiel Dakin MD, PGY2  Neurology & Neurocritical Care   Neurology Line: [phone]  [employer]: [facility] NPs

## 2021-08-17 NOTE — PLAN OF CARE
Problem: HEMODYNAMIC STATUS  Goal: Patient has stable vital signs and fluid balance  8/17/2021 0857 by Lidpeytone Record, RN  Outcome: Ongoing  VSS. Pt tolerating PO fluids and voiding adequately. Problem: COMMUNICATION IMPAIRMENT  Goal: Ability to express needs and understand communication  Outcome: Ongoing  Pt able to communicate and express needs to staff. No dysarthria noted. Problem: Falls - Risk of:  Goal: Will remain free from falls  Description: Will remain free from falls  8/17/2021 0857 by Bhavine Record, RN  Outcome: Ongoing  Fall precautions in place. Bed is in lowest position, wheels locked, bed alarm on, non skid socks on. Call light and bedside table within reach. Pt calls out appropriately. Pt is up x1 SBA. Will continue to assess and monitor.

## 2021-08-17 NOTE — PLAN OF CARE
Problem: HEMODYNAMIC STATUS  Goal: Patient has stable vital signs and fluid balance  Outcome: Ongoing   VSS with the exception of elevated B/P. No meds being given at this time due to pt condition. Will continue to monitor. Problem: Falls - Risk of:  Goal: Will remain free from falls  8/17/2021 0058 by Zachary Anthony RN  Outcome: Ongoing   Pt remains free from injury during this shift. Pt is up with assist x 1 person. Encourage pt to call for all assistance. Call light is in reach, bed alarm is activated. Bed locked and in lowest position. Will continue to monitor.

## 2021-08-18 VITALS
OXYGEN SATURATION: 97 % | DIASTOLIC BLOOD PRESSURE: 90 MMHG | SYSTOLIC BLOOD PRESSURE: 173 MMHG | HEART RATE: 68 BPM | TEMPERATURE: 97.6 F | RESPIRATION RATE: 16 BRPM

## 2021-08-18 LAB
ANION GAP SERPL CALCULATED.3IONS-SCNC: 11 MMOL/L (ref 3–16)
BUN BLDV-MCNC: 17 MG/DL (ref 7–20)
CALCIUM SERPL-MCNC: 9.3 MG/DL (ref 8.3–10.6)
CHLORIDE BLD-SCNC: 105 MMOL/L (ref 99–110)
CO2: 22 MMOL/L (ref 21–32)
CREAT SERPL-MCNC: 0.7 MG/DL (ref 0.8–1.3)
GFR AFRICAN AMERICAN: >60
GFR NON-AFRICAN AMERICAN: >60
GLUCOSE BLD-MCNC: 102 MG/DL (ref 70–99)
HCT VFR BLD CALC: 42.1 % (ref 40.5–52.5)
HEMOGLOBIN: 14.8 G/DL (ref 13.5–17.5)
MCH RBC QN AUTO: 31.2 PG (ref 26–34)
MCHC RBC AUTO-ENTMCNC: 35.2 G/DL (ref 31–36)
MCV RBC AUTO: 88.5 FL (ref 80–100)
PDW BLD-RTO: 13.6 % (ref 12.4–15.4)
PLATELET # BLD: 196 K/UL (ref 135–450)
PMV BLD AUTO: 7.4 FL (ref 5–10.5)
POTASSIUM REFLEX MAGNESIUM: 4 MMOL/L (ref 3.5–5.1)
RBC # BLD: 4.76 M/UL (ref 4.2–5.9)
SODIUM BLD-SCNC: 138 MMOL/L (ref 136–145)
WBC # BLD: 8.9 K/UL (ref 4–11)

## 2021-08-18 PROCEDURE — 2580000003 HC RX 258: Performed by: STUDENT IN AN ORGANIZED HEALTH CARE EDUCATION/TRAINING PROGRAM

## 2021-08-18 PROCEDURE — 6370000000 HC RX 637 (ALT 250 FOR IP): Performed by: STUDENT IN AN ORGANIZED HEALTH CARE EDUCATION/TRAINING PROGRAM

## 2021-08-18 PROCEDURE — 36415 COLL VENOUS BLD VENIPUNCTURE: CPT

## 2021-08-18 PROCEDURE — 85027 COMPLETE CBC AUTOMATED: CPT

## 2021-08-18 PROCEDURE — 80048 BASIC METABOLIC PNL TOTAL CA: CPT

## 2021-08-18 RX ORDER — ATORVASTATIN CALCIUM 40 MG/1
40 TABLET, FILM COATED ORAL NIGHTLY
Qty: 30 TABLET | Refills: 3 | Status: SHIPPED | OUTPATIENT
Start: 2021-08-18

## 2021-08-18 RX ORDER — CLOPIDOGREL BISULFATE 75 MG/1
75 TABLET ORAL DAILY
Qty: 30 TABLET | Refills: 3 | Status: SHIPPED | OUTPATIENT
Start: 2021-08-19

## 2021-08-18 RX ORDER — MECLIZINE HCL 12.5 MG/1
12.5 TABLET ORAL 3 TIMES DAILY PRN
Qty: 30 TABLET | Refills: 1 | Status: SHIPPED | OUTPATIENT
Start: 2021-08-18 | End: 2021-08-28

## 2021-08-18 RX ORDER — ASPIRIN 81 MG/1
81 TABLET ORAL DAILY
Qty: 30 TABLET | Refills: 3 | Status: SHIPPED | OUTPATIENT
Start: 2021-08-19

## 2021-08-18 RX ORDER — BLOOD PRESSURE TEST KIT
KIT MISCELLANEOUS
Qty: 1 KIT | Refills: 0 | Status: SHIPPED | OUTPATIENT
Start: 2021-08-18

## 2021-08-18 RX ORDER — MECLIZINE HCL 12.5 MG/1
12.5 TABLET ORAL 3 TIMES DAILY PRN
Qty: 15 TABLET | Refills: 0 | Status: SHIPPED | OUTPATIENT
Start: 2021-08-18 | End: 2021-08-18 | Stop reason: SDUPTHER

## 2021-08-18 RX ORDER — TRIAMTERENE AND HYDROCHLOROTHIAZIDE 37.5; 25 MG/1; MG/1
1 CAPSULE ORAL DAILY
Qty: 30 CAPSULE | Refills: 3 | Status: SHIPPED | OUTPATIENT
Start: 2021-08-18

## 2021-08-18 RX ORDER — TRIAMTERENE AND HYDROCHLOROTHIAZIDE 37.5; 25 MG/1; MG/1
1 CAPSULE ORAL DAILY
Qty: 30 CAPSULE | Refills: 3 | Status: SHIPPED | OUTPATIENT
Start: 2021-08-18 | End: 2021-08-18 | Stop reason: SDUPTHER

## 2021-08-18 RX ORDER — MECLIZINE HYDROCHLORIDE 25 MG/1
12.5 TABLET ORAL 3 TIMES DAILY PRN
Status: DISCONTINUED | OUTPATIENT
Start: 2021-08-18 | End: 2021-08-18 | Stop reason: HOSPADM

## 2021-08-18 RX ADMIN — ACETAMINOPHEN 650 MG: 325 TABLET ORAL at 07:30

## 2021-08-18 RX ADMIN — Medication 10 ML: at 07:30

## 2021-08-18 RX ADMIN — ASPIRIN 81 MG: 81 TABLET, COATED ORAL at 07:30

## 2021-08-18 RX ADMIN — MECLIZINE HYDROCHLORIDE 12.5 MG: 25 TABLET ORAL at 10:54

## 2021-08-18 RX ADMIN — CLOPIDOGREL BISULFATE 75 MG: 75 TABLET ORAL at 07:30

## 2021-08-18 ASSESSMENT — PAIN SCALES - GENERAL
PAINLEVEL_OUTOF10: 2
PAINLEVEL_OUTOF10: 6

## 2021-08-18 ASSESSMENT — PAIN DESCRIPTION - FREQUENCY: FREQUENCY: CONTINUOUS

## 2021-08-18 ASSESSMENT — PAIN DESCRIPTION - ONSET: ONSET: ON-GOING

## 2021-08-18 ASSESSMENT — PAIN DESCRIPTION - PROGRESSION: CLINICAL_PROGRESSION: NOT CHANGED

## 2021-08-18 ASSESSMENT — PAIN DESCRIPTION - LOCATION: LOCATION: GENERALIZED

## 2021-08-18 ASSESSMENT — PAIN - FUNCTIONAL ASSESSMENT: PAIN_FUNCTIONAL_ASSESSMENT: ACTIVITIES ARE NOT PREVENTED

## 2021-08-18 ASSESSMENT — PAIN DESCRIPTION - DESCRIPTORS: DESCRIPTORS: ACHING

## 2021-08-18 ASSESSMENT — PAIN DESCRIPTION - PAIN TYPE: TYPE: ACUTE PAIN

## 2021-08-18 NOTE — PLAN OF CARE
Problem: Falls - Risk of:  Goal: Will remain free from falls  Description: Will remain free from falls  Outcome: Ongoing   Patient has remained free of falls. 2/4 bed rails up, bed locked and in lowest position, call light within reach. Patient instructed on use of call light and uses appropriately. Bed alarm on. Non-skid footwear and fall band on. Problem: HEMODYNAMIC STATUS  Goal: Patient has stable vital signs and fluid balance  Outcome: Ongoing   VSS. Denies pain. NIH a 0. Dizzy at times.

## 2021-08-18 NOTE — PROGRESS NOTES
Patient alert and oriented x4. VSS. Neuro checks WNL. Denies pain. Dizziness at times. Tolerating ambulation well. Fall precautions in place.

## 2021-08-18 NOTE — PROGRESS NOTES
Pt is A&O, VSS. Tolerating diet well. Voiding adequately per bathroom. Up x1 SBA. Denies any pain or n/v at this time. NIH of 0, neuro checks remain unchanged. Will d/c home today. Daughter at bedside. All fall precautions in place. Call light within reach.

## 2021-08-18 NOTE — PROGRESS NOTES
Pt discharged with all belongings. IVs removed. All questions answered. Daughter at bedside to transport.

## 2021-08-18 NOTE — DISCHARGE SUMMARY
neurochecks were performed. Neurology was consulted. Patient endorsed episodes of dizziness especially when he is not laying flat in bed. Dizziness would seem to, but he would be sitting in a chair for a long time. There is some concern with hypertension being likely secondary to undiagnosed RAZIA. Outpatient sleep study has been ordered. On the date of discharge, the patient reported feeling stable. The patient was found to not be in any acute distress, with vital signs within normal limits, and no new abnormalities on physical examination. Further, the patient expressed appropriate understanding of, and agreement with, the discharge recommendations, medications, and plan. Physical Exam:  BP (!) 152/86   Pulse 67   Temp 97.6 °F (36.4 °C) (Oral)   Resp 16   SpO2 95%   General appearance: alert, appears stated age and cooperative  Head: Normocephalic, without obvious abnormality, atraumatic  Eyes: conjunctivae/corneas clear. PERRL, EOM's intact. Fundi benign. Ears: normal TM's and external ear canals both ears  Nose: Nares normal. Septum midline. Mucosa normal. No drainage or sinus tenderness. Throat: lips, mucosa, and tongue normal; teeth and gums normal  Neck: no adenopathy, no carotid bruit, no JVD, supple, symmetrical, trachea midline and thyroid not enlarged, symmetric, no tenderness/mass/nodules  Lungs: clear to auscultation bilaterally  Heart: regular rate and rhythm, S1, S2 normal, no murmur, click, rub or gallop  Abdomen: soft, non-tender; bowel sounds normal; no masses,  no organomegaly  Extremities: extremities normal, atraumatic, no cyanosis or edema  Neurologic: Grossly normal    Consults: neurology and NSGY  Disposition: home  Discharged Condition: Stable  Follow Up: Primary Care Physician in one week: Follow-up Dr. Roman Otoole neurosurgery in 3 months.     DISCHARGE MEDICATION:     Medication List      ASK your doctor about these medications    amLODIPine 5 MG tablet  Commonly known as: NORVASC     hydroCHLOROthiazide 25 MG tablet  Commonly known as: HYDRODIURIL          Activity: No driving for 3 months  Diet: Low salt diet   Wound Care: none needed    Time Spent on discharge is more than 30 minutes    Signed:  Aubree Escalante MD  Internal Medicine, PGY1  8/18/2021    Patient seen and examined, labs and imaging reviewed, agree with assessment and plan as outlined above. patients condition continues to improve doing well, asked patient to monitor side effects of medications which i've discussed at length, recurrence of symptoms or new symptoms including but not limited to chest pain, shortness of breath, nausea, vomiting, fevers or chills and seek immediate medical attention or call 911. Greater than 30 minutes spent on case on day of discharge.      Mechelle Winn MD FACP

## 2021-08-18 NOTE — PLAN OF CARE
Problem: HEMODYNAMIC STATUS  Goal: Patient has stable vital signs and fluid balance  8/18/2021 0949 by Rayna Hoyos RN  Outcome: Ongoing  VSS. Tolerating PO fluids and voiding adequately. Problem: Falls - Risk of:  Goal: Will remain free from falls  Description: Will remain free from falls  8/18/2021 0949 by Rayna Hoyos RN  Outcome: Ongoing  Fall precautions in place. Bed is in lowest position, wheels locked, bed alarm on, non skid socks on. Call light and bedside table within reach. Pt calls out appropriately. Pt is up x1 with SBA. Will continue to assess and monitor.

## 2021-11-30 ENCOUNTER — APPOINTMENT (OUTPATIENT)
Dept: CT IMAGING | Age: 71
End: 2021-11-30
Payer: MEDICARE

## 2021-11-30 ENCOUNTER — HOSPITAL ENCOUNTER (OUTPATIENT)
Dept: CT IMAGING | Age: 71
Discharge: HOME | End: 2021-11-30
Payer: MEDICARE

## 2021-11-30 DIAGNOSIS — I65.1 BASILAR ARTERY OCCLUSION: ICD-10-CM

## 2021-11-30 DIAGNOSIS — I65.1 BASILAR ARTERY STENOSIS: ICD-10-CM

## 2021-11-30 LAB
GFR AFRICAN AMERICAN: >60
GFR NON-AFRICAN AMERICAN: >60
PERFORMED ON: ABNORMAL
POC CREATININE: 0.7 MG/DL (ref 0.8–1.3)
POC SAMPLE TYPE: ABNORMAL

## 2021-11-30 PROCEDURE — 6360000004 HC RX CONTRAST MEDICATION: Performed by: SINGLE SPECIALTY

## 2021-11-30 PROCEDURE — 70498 CT ANGIOGRAPHY NECK: CPT

## 2021-11-30 PROCEDURE — 70496 CT ANGIOGRAPHY HEAD: CPT

## 2021-11-30 PROCEDURE — 82565 ASSAY OF CREATININE: CPT

## 2021-11-30 RX ADMIN — IOPAMIDOL 80 ML: 755 INJECTION, SOLUTION INTRAVENOUS at 16:00
